# Patient Record
Sex: FEMALE | Race: WHITE | Employment: FULL TIME | URBAN - METROPOLITAN AREA
[De-identification: names, ages, dates, MRNs, and addresses within clinical notes are randomized per-mention and may not be internally consistent; named-entity substitution may affect disease eponyms.]

---

## 2017-09-28 ENCOUNTER — OFFICE VISIT (OUTPATIENT)
Dept: FAMILY MEDICINE CLINIC | Facility: CLINIC | Age: 22
End: 2017-09-28

## 2017-09-28 VITALS
SYSTOLIC BLOOD PRESSURE: 112 MMHG | HEART RATE: 106 BPM | OXYGEN SATURATION: 98 % | WEIGHT: 228 LBS | TEMPERATURE: 98 F | HEIGHT: 71 IN | BODY MASS INDEX: 31.92 KG/M2 | RESPIRATION RATE: 14 BRPM | DIASTOLIC BLOOD PRESSURE: 70 MMHG

## 2017-09-28 DIAGNOSIS — L03.116 CELLULITIS OF LEFT LOWER LEG: Primary | ICD-10-CM

## 2017-09-28 PROCEDURE — 99202 OFFICE O/P NEW SF 15 MIN: CPT | Performed by: NURSE PRACTITIONER

## 2017-09-28 RX ORDER — CEPHALEXIN 500 MG/1
500 CAPSULE ORAL 3 TIMES DAILY
Qty: 21 CAPSULE | Refills: 0 | Status: SHIPPED | OUTPATIENT
Start: 2017-09-28 | End: 2017-10-05

## 2017-09-29 NOTE — PROGRESS NOTES
CHIEF COMPLAINT:   Patient presents with:  Derm Problem      HPI:     Steven Flores is a 23year old female who presents with concerns of infected cut. Patient first noticed symptoms 1 week ago with worsening over 1-2 days.   Reports erythema, increased war LUNGS: clear to auscultation bilaterally, no wheezes or rhonchi. Breathing is non labored. CARDIO: RRR without murmur  EXTREMITIES: no cyanosis, clubbing or edema. Cap refill brisk- less than 2 seconds. LYMPH: No popletiel lymphadenopathy.       ASSESSM Cellulitis is an infection of the deep layers of skin. A break in the skin, such as a cut or scratch, can let bacteria under the skin. If the bacteria get to deep layers of the skin, it can be serious.  If not treated, cellulitis can get into the bloodstrea Date Last Reviewed: 9/1/2016  © 0700-3079 The [address] [place] [address], [address] [place], [place] [address]. All rights reserved. This information is not intended as a substitute for professional medical care.  Always follow your healthcare professional'

## 2017-09-29 NOTE — PATIENT INSTRUCTIONS
· Finish all oral antibiotics to prevent resistance  · Apply warm compress to infection site 3 times daily for 15 minutes as a time.  Be sure to use a clean wash rag each time  · Do not try to drain or \"pop\" lesion  · If skin becomes more red, swollen, p leg, keep your leg raised while sitting. This will help to reduce swelling. · Take all of the antibiotic medicine exactly as directed until it is gone. Do not miss any doses, especially during the first 7 days.  Don’t stop taking the medicine when your sym

## 2018-01-19 ENCOUNTER — OFFICE VISIT (OUTPATIENT)
Dept: FAMILY MEDICINE CLINIC | Facility: CLINIC | Age: 23
End: 2018-01-19

## 2018-01-19 VITALS
SYSTOLIC BLOOD PRESSURE: 127 MMHG | HEART RATE: 75 BPM | DIASTOLIC BLOOD PRESSURE: 80 MMHG | BODY MASS INDEX: 33.07 KG/M2 | WEIGHT: 231 LBS | HEIGHT: 70 IN

## 2018-01-19 DIAGNOSIS — Z00.00 WELL ADULT EXAM: Primary | ICD-10-CM

## 2018-01-19 DIAGNOSIS — L83 ACANTHOSIS NIGRICANS: ICD-10-CM

## 2018-01-19 DIAGNOSIS — Z23 NEEDS FLU SHOT: ICD-10-CM

## 2018-01-19 DIAGNOSIS — E66.9 CLASS 1 OBESITY WITHOUT SERIOUS COMORBIDITY WITH BODY MASS INDEX (BMI) OF 33.0 TO 33.9 IN ADULT, UNSPECIFIED OBESITY TYPE: ICD-10-CM

## 2018-01-19 DIAGNOSIS — N92.6 IRREGULAR MENSTRUATION: ICD-10-CM

## 2018-01-19 DIAGNOSIS — Z83.3 FAMILY HISTORY OF DIABETES MELLITUS: ICD-10-CM

## 2018-01-19 PROBLEM — E66.811 CLASS 1 OBESITY WITHOUT SERIOUS COMORBIDITY WITH BODY MASS INDEX (BMI) OF 33.0 TO 33.9 IN ADULT: Status: ACTIVE | Noted: 2018-01-19

## 2018-01-19 LAB
CONTROL LINE PRESENT WITH A CLEAR BACKGROUND (YES/NO): YES YES/NO
KIT LOT #: NORMAL NUMERIC

## 2018-01-19 PROCEDURE — 90686 IIV4 VACC NO PRSV 0.5 ML IM: CPT | Performed by: NURSE PRACTITIONER

## 2018-01-19 PROCEDURE — 81025 URINE PREGNANCY TEST: CPT | Performed by: NURSE PRACTITIONER

## 2018-01-19 PROCEDURE — 99395 PREV VISIT EST AGE 18-39: CPT | Performed by: NURSE PRACTITIONER

## 2018-01-19 PROCEDURE — 90471 IMMUNIZATION ADMIN: CPT | Performed by: NURSE PRACTITIONER

## 2018-01-19 RX ORDER — NORETHINDRONE ACETATE AND ETHINYL ESTRADIOL 1; .02 MG/1; MG/1
1 TABLET ORAL DAILY
Qty: 3 PACKAGE | Refills: 4 | Status: SHIPPED | OUTPATIENT
Start: 2018-01-19 | End: 2019-02-01

## 2018-01-19 RX ORDER — NORETHINDRONE ACETATE AND ETHINYL ESTRADIOL 1; .02 MG/1; MG/1
1 TABLET ORAL DAILY
Qty: 1 PACKAGE | Refills: 11 | Status: SHIPPED | OUTPATIENT
Start: 2018-01-19 | End: 2018-01-19 | Stop reason: CLARIF

## 2018-01-19 NOTE — PROGRESS NOTES
HPI    Pt here for annual physical feeling well. Is concerned about irregular periods. Had period at beginning of January-stopped for one day and then restarted.   Will get period twice in a short period of time and then stops for a few months and then she Not on file    Drug use: Unknown     Other Topics Concern   None on file     Social History Narrative   None on file         Current Outpatient Prescriptions:  Norethindrone Acet-Ethinyl Est 1-20 MG-MCG Oral Tab Take 1 tablet by mouth daily.  Disp: 3 Packag hcg-neg  Assessment:     1. Well adult exam    2. Irregular menstruation    3. Acanthosis nigricans    4. Family history of diabetes mellitus    5. Needs flu shot    6.  Class 1 obesity without serious comorbidity with body mass index (BMI) of 33.0 to 33.9

## 2018-01-19 NOTE — PATIENT INSTRUCTIONS
Everything you eat and drink over time matters. The right mix can help you be healthier now and in the future. Start with small changes to make healthier choices you can enjoy. Find your healthy eating style and maintain it for a lifetime.   This me Flu viruses are thought to spread mainly from person to person through droplets made when people with flu cough, sneeze, or talk. Flu viruses also may spread when people touch something with flu virus on it and then touch their mouth, eyes, or nose.  Many o What additional steps can I take at work to help stop the spread of germs that can cause respiratory illness, like flu? Find out about your employer’s plans if an outbreak of flu or another illness occurs and whether flu vaccinations are offered on-site. -use back up birth control for 2 weeks whenever you take antibiotics as some antibiotics may interfere with effectiveness of birth control pills  -use back up birth control for first pack of birth control pills  -pt denies personal or family history of blo

## 2018-01-20 LAB
ABSOLUTE BASOPHILS: 43 CELLS/UL (ref 0–200)
ABSOLUTE EOSINOPHILS: 68 CELLS/UL (ref 15–500)
ABSOLUTE LYMPHOCYTES: 2372 CELLS/UL (ref 850–3900)
ABSOLUTE MONOCYTES: 340 CELLS/UL (ref 200–950)
ABSOLUTE NEUTROPHILS: 5678 CELLS/UL (ref 1500–7800)
ALBUMIN/GLOBULIN RATIO: 1.4 (CALC) (ref 1–2.5)
ALBUMIN: 4.5 G/DL (ref 3.6–5.1)
ALKALINE PHOSPHATASE: 69 U/L (ref 33–115)
ALT: 14 U/L (ref 6–29)
AST: 14 U/L (ref 10–30)
BASOPHILS: 0.5 %
BILIRUBIN, TOTAL: 0.4 MG/DL (ref 0.2–1.2)
BUN: 11 MG/DL (ref 7–25)
CALCIUM: 9.7 MG/DL (ref 8.6–10.2)
CARBON DIOXIDE: 26 MMOL/L (ref 20–31)
CHLORIDE: 106 MMOL/L (ref 98–110)
CHOL/HDLC RATIO: 2.4 (CALC)
CHOLESTEROL, TOTAL: 133 MG/DL
CREATININE: 0.59 MG/DL (ref 0.5–1.1)
EGFR IF AFRICN AM: 153 ML/MIN/1.73M2
EGFR IF NONAFRICN AM: 132 ML/MIN/1.73M2
EOSINOPHILS: 0.8 %
GLOBULIN: 3.3 G/DL (CALC) (ref 1.9–3.7)
GLUCOSE: 82 MG/DL (ref 65–99)
HDL CHOLESTEROL: 56 MG/DL
HEMATOCRIT: 36.6 % (ref 35–45)
HEMOGLOBIN A1C: 5.1 % OF TOTAL HGB
HEMOGLOBIN: 12.2 G/DL (ref 11.7–15.5)
LDL-CHOLESTEROL: 63 MG/DL (CALC)
LYMPHOCYTES: 27.9 %
MCH: 26.3 PG (ref 27–33)
MCHC: 33.3 G/DL (ref 32–36)
MCV: 78.9 FL (ref 80–100)
MONOCYTES: 4 %
MPV: 9.9 FL (ref 7.5–12.5)
NEUTROPHILS: 66.8 %
NON-HDL CHOLESTEROL: 77 MG/DL (CALC)
PLATELET COUNT: 299 THOUSAND/UL (ref 140–400)
POTASSIUM: 4.1 MMOL/L (ref 3.5–5.3)
PROTEIN, TOTAL: 7.8 G/DL (ref 6.1–8.1)
RDW: 13.9 % (ref 11–15)
RED BLOOD CELL COUNT: 4.64 MILLION/UL (ref 3.8–5.1)
SODIUM: 140 MMOL/L (ref 135–146)
TRIGLYCERIDES: 59 MG/DL
TSH W/REFLEX TO FT4: 1.24 MIU/L
VITAMIN B12: 496 PG/ML (ref 200–1100)
VITAMIN D, 25-OH, TOTAL: 18 NG/ML (ref 30–100)
WHITE BLOOD CELL COUNT: 8.5 THOUSAND/UL (ref 3.8–10.8)

## 2018-09-13 ENCOUNTER — OFFICE VISIT (OUTPATIENT)
Dept: FAMILY MEDICINE CLINIC | Facility: CLINIC | Age: 23
End: 2018-09-13
Payer: COMMERCIAL

## 2018-09-13 VITALS
BODY MASS INDEX: 30.92 KG/M2 | HEART RATE: 108 BPM | WEIGHT: 216 LBS | HEIGHT: 70 IN | TEMPERATURE: 99 F | SYSTOLIC BLOOD PRESSURE: 116 MMHG | DIASTOLIC BLOOD PRESSURE: 75 MMHG

## 2018-09-13 DIAGNOSIS — J02.9 SORE THROAT: Primary | ICD-10-CM

## 2018-09-13 LAB
CONTROL LINE PRESENT WITH A CLEAR BACKGROUND (YES/NO): YES YES/NO
KIT LOT #: NORMAL NUMERIC

## 2018-09-13 PROCEDURE — 99213 OFFICE O/P EST LOW 20 MIN: CPT | Performed by: NURSE PRACTITIONER

## 2018-09-13 PROCEDURE — 87880 STREP A ASSAY W/OPTIC: CPT | Performed by: NURSE PRACTITIONER

## 2018-09-13 RX ORDER — AMOXICILLIN AND CLAVULANATE POTASSIUM 875; 125 MG/1; MG/1
1 TABLET, FILM COATED ORAL 2 TIMES DAILY
Qty: 14 TABLET | Refills: 0 | Status: SHIPPED | OUTPATIENT
Start: 2018-09-13 | End: 2018-09-20

## 2018-09-13 NOTE — PATIENT INSTRUCTIONS
When You Have a Sore Throat    A sore throat can be painful. There are many reasons why you may have a sore throat. Your healthcare provider will work with you to find the cause of your sore throat. He or she will also find the best treatment for you.   Thamas Necessary During the exam, your healthcare provider checks your ears, nose, and throat for problems.  He or she also checks for swelling in the neck, and may listen to your chest.  Possible tests  Other tests your healthcare provider may perform include:  · A throat If your sore throat is due to a bacterial infection, antibiotics may speed healing and prevent complications.  Although group A streptococcus (\"strep throat\" or GAS) is the major treatable infection for a sore throat, GAS causes only 5% to 15% of sore thr © 5904-2000 The [address]. [address] [facility], [address] [place]. All rights reserved. This information is not intended as a substitute for professional medical care. Always follow your healthcare professional's instructions.       STREP THR -Call or return to office if symptoms are not markedly improved within 3-4 days or if symptoms are worsening    If there is no significant improvement by three days after starting the antibiotic, or there is any significant worsening before then, or you ha

## 2018-09-13 NOTE — PROGRESS NOTES
HPI  Pt presents for sore throat and ear pain since yesterday. Having difficulty swallowing, hurts to talk. Has not taken any otc meds. Review of Systems   Constitutional: Positive for activity change, appetite change and fatigue.    HENT: Positive fo Not Asked        Weight Concern: Not Asked    Social History Narrative      Not on file        Current Outpatient Medications:  Amoxicillin-Pot Clavulanate 875-125 MG Oral Tab Take 1 tablet by mouth 2 (two) times daily for 7 days.  Disp: 14 tablet Rfl: 0 discussed   advised to use back up birth control for next 2 weeks while on anbx    Please call if symptoms worsen or are not resolving.            Relevant Medications    Amoxicillin-Pot Clavulanate 875-125 MG Oral Tab    Other Relevant Orders    STREP A AS

## 2018-09-13 NOTE — ASSESSMENT & PLAN NOTE
Start augmentin 875 mg I po bid x 7 days  Supportive care discussed   advised to use back up birth control for next 2 weeks while on anbx    Please call if symptoms worsen or are not resolving.

## 2019-01-26 RX ORDER — NORETHINDRONE ACETATE/ETHINYL ESTRADIOL AND FERROUS FUMARATE 1MG-20(21)
KIT ORAL
Qty: 28 TABLET | Refills: 3 | OUTPATIENT
Start: 2019-01-26

## 2019-02-01 ENCOUNTER — OFFICE VISIT (OUTPATIENT)
Dept: FAMILY MEDICINE CLINIC | Facility: CLINIC | Age: 24
End: 2019-02-01
Payer: COMMERCIAL

## 2019-02-01 VITALS
HEART RATE: 88 BPM | WEIGHT: 213 LBS | DIASTOLIC BLOOD PRESSURE: 82 MMHG | SYSTOLIC BLOOD PRESSURE: 123 MMHG | TEMPERATURE: 98 F | HEIGHT: 70 IN | BODY MASS INDEX: 30.49 KG/M2

## 2019-02-01 DIAGNOSIS — J06.9 VIRAL UPPER RESPIRATORY TRACT INFECTION: Primary | ICD-10-CM

## 2019-02-01 PROCEDURE — 99213 OFFICE O/P EST LOW 20 MIN: CPT | Performed by: NURSE PRACTITIONER

## 2019-02-01 RX ORDER — NORETHINDRONE ACETATE/ETHINYL ESTRADIOL AND FERROUS FUMARATE 1MG-20(21)
KIT ORAL
Qty: 28 TABLET | Refills: 3 | OUTPATIENT
Start: 2019-02-01

## 2019-02-01 RX ORDER — NORETHINDRONE ACETATE AND ETHINYL ESTRADIOL 1; .02 MG/1; MG/1
1 TABLET ORAL DAILY
Qty: 1 PACKAGE | Refills: 0 | Status: SHIPPED | OUTPATIENT
Start: 2019-02-01 | End: 2019-02-12

## 2019-02-01 NOTE — PROGRESS NOTES
HPI    Pt here for congestion and itchy throat for the past 2 days. Denies fever, ear pain, headache, or coughing. Has not tried any otc meds.    Review of Systems   Constitutional: Negative for activity change, appetite change, fatigue, fever and unexpect History      Marital status: Single      Spouse name: Not on file      Number of children: Not on file      Years of education: Not on file      Highest education level: Not on file    Social Needs      Financial resource strain: Not on file      Food inse rate, regular rhythm and normal heart sounds. No murmur heard. Edema not present. Pulmonary/Chest: Effort normal and breath sounds normal. No respiratory distress. She has no wheezes. She has no rales. She exhibits no tenderness. Abdominal: Soft.  B

## 2019-02-12 ENCOUNTER — OFFICE VISIT (OUTPATIENT)
Dept: FAMILY MEDICINE CLINIC | Facility: CLINIC | Age: 24
End: 2019-02-12
Payer: COMMERCIAL

## 2019-02-12 VITALS
WEIGHT: 213 LBS | HEART RATE: 92 BPM | HEIGHT: 70 IN | DIASTOLIC BLOOD PRESSURE: 75 MMHG | BODY MASS INDEX: 30.49 KG/M2 | SYSTOLIC BLOOD PRESSURE: 114 MMHG

## 2019-02-12 DIAGNOSIS — N92.6 IRREGULAR MENSTRUATION: Primary | ICD-10-CM

## 2019-02-12 DIAGNOSIS — Z00.00 WELL ADULT EXAM: ICD-10-CM

## 2019-02-12 PROCEDURE — 99395 PREV VISIT EST AGE 18-39: CPT | Performed by: NURSE PRACTITIONER

## 2019-02-12 RX ORDER — NORETHINDRONE ACETATE AND ETHINYL ESTRADIOL 1; .02 MG/1; MG/1
1 TABLET ORAL DAILY
Qty: 3 PACKAGE | Refills: 4 | Status: SHIPPED | OUTPATIENT
Start: 2019-02-12 | End: 2019-11-05

## 2019-02-12 NOTE — PROGRESS NOTES
HPI  Pt here for general physical.  Kisha Woodson well without complaints. On ocps-periods are regular; has never had sexual intercourse. Ocps help regulate her period. Uncertain if got all of her gardisil vaccines.      fmh-diabetes-aunts    Diet is good; d Date   • Adenoidectomy         History reviewed. No pertinent family history.     Social History    Socioeconomic History      Marital status: Single      Spouse name: Not on file      Number of children: Not on file      Years of education: Not on file oriented to person, place, and time. She appears well-developed and well-nourished. No distress. HENT:   Head: Normocephalic and atraumatic.    Right Ear: Tympanic membrane and ear canal normal. No cerumen present  Left Ear: Tympanic membrane and ear Forrestine Senior COMP METABOLIC PANEL (14)    CBC, PLATELET; NO DIFFERENTIAL    VITAMIN D, 25-HYDROXY    TSH W REFLEX TO FREE T4                      Discussed plan of care with pt and pt is in agreement. All questions answered. Pt to call with questions or concerns.     Enc

## 2019-02-17 PROBLEM — J06.9 VIRAL UPPER RESPIRATORY TRACT INFECTION: Status: RESOLVED | Noted: 2019-02-01 | Resolved: 2019-02-17

## 2019-02-24 ENCOUNTER — HOSPITAL ENCOUNTER (EMERGENCY)
Facility: HOSPITAL | Age: 24
Discharge: HOME | End: 2019-02-24
Attending: EMERGENCY MEDICINE
Payer: OTHER MISCELLANEOUS

## 2019-02-24 ENCOUNTER — APPOINTMENT (OUTPATIENT)
Dept: GENERAL RADIOLOGY | Facility: HOSPITAL | Age: 24
End: 2019-02-24
Payer: OTHER MISCELLANEOUS

## 2019-02-24 VITALS
TEMPERATURE: 99 F | BODY MASS INDEX: 29.82 KG/M2 | DIASTOLIC BLOOD PRESSURE: 66 MMHG | SYSTOLIC BLOOD PRESSURE: 128 MMHG | OXYGEN SATURATION: 96 % | HEART RATE: 71 BPM | WEIGHT: 213 LBS | RESPIRATION RATE: 18 BRPM | HEIGHT: 71 IN

## 2019-02-24 DIAGNOSIS — S93.402A SPRAIN OF LEFT ANKLE, UNSPECIFIED LIGAMENT, INITIAL ENCOUNTER: Primary | ICD-10-CM

## 2019-02-24 PROCEDURE — 73610 X-RAY EXAM OF ANKLE: CPT | Performed by: EMERGENCY MEDICINE

## 2019-02-24 PROCEDURE — 99283 EMERGENCY DEPT VISIT LOW MDM: CPT

## 2019-02-25 NOTE — ED NOTES
Pt presents to ED after slipping on the ice at 1130 today and hurting her left ankle. Pt has strong pedal pulse, cms intact. Pt able to wiggle toes.

## 2019-02-25 NOTE — ED PROVIDER NOTES
Patient Seen in: [facility] AND [facility] Emergency Department    History   Patient presents with: Ankle Injury    Stated Complaint: L foot inj. HPI    The patient is a 26-year-old female who slipped on the ice earlier today twisting her left ankle.   Pain o display       Aircast and crutches given      MDM      Pulse Ox: 96%, Normal, room air    Radiology findings: Xr Ankle (min 3 Views), Left (cpt=73610)    Result Date: 2/24/2019  CONCLUSION:   No acute fracture.   Dictated by (CST): Javed Yeager MD on 2

## 2019-11-05 ENCOUNTER — OFFICE VISIT (OUTPATIENT)
Dept: FAMILY MEDICINE CLINIC | Facility: CLINIC | Age: 24
End: 2019-11-05
Payer: COMMERCIAL

## 2019-11-05 VITALS
WEIGHT: 219 LBS | HEART RATE: 73 BPM | BODY MASS INDEX: 30.66 KG/M2 | HEIGHT: 71 IN | DIASTOLIC BLOOD PRESSURE: 75 MMHG | SYSTOLIC BLOOD PRESSURE: 115 MMHG

## 2019-11-05 DIAGNOSIS — Z30.017 ENCOUNTER FOR INITIAL PRESCRIPTION OF IMPLANTABLE SUBDERMAL CONTRACEPTIVE: ICD-10-CM
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
## 2019-11-05 DIAGNOSIS — Z30.017 INSERTION OF IMPLANTABLE SUBDERMAL CONTRACEPTIVE: ICD-10-CM

## 2019-11-05 DIAGNOSIS — S40.861A INSECT BITE OF RIGHT UPPER EXTREMITY, INITIAL ENCOUNTER: Primary | ICD-10-CM

## 2019-11-05 DIAGNOSIS — S40.862A INSECT BITE OF LEFT UPPER EXTREMITY, INITIAL ENCOUNTER: ICD-10-CM
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
## 2019-11-05 PROCEDURE — 11981 INSERTION DRUG DLVR IMPLANT: CPT | Performed by: NURSE PRACTITIONER

## 2019-11-05 PROCEDURE — 99213 OFFICE O/P EST LOW 20 MIN: CPT | Performed by: NURSE PRACTITIONER

## 2019-11-05 RX ORDER — PREDNISONE 20 MG/1
TABLET ORAL
Qty: 10 TABLET | Refills: 0 | Status: SHIPPED | OUTPATIENT
Start: 2019-11-05 | End: 2020-06-09 | Stop reason: ALTCHOICE

## 2019-11-05 NOTE — PATIENT INSTRUCTIONS
Insect, Spider, and Scorpion Bites and Stings     The black  (top) and brown recluse (bottom) are two poisonous spiders found in the [place]. Most insect bites are harmless and cause only minor swelling or itching.  But if you’re allergic t Easing symptoms of an insect bite or sting  · Try to remove a stinger you can see. Use your fingernail, a knife edge, or credit card to scrape against the skin. Do not squeeze or pull.   · Apply ice or a cold compress to reduce pain and swelling (keep a [employer] · If you had a severe reaction, the provider may prescribe an epinephrine kit. Epinephrine will stop an allergic reaction from getting worse. Before you leave the hospital, be sure that you understand when and how to use this medicine.   · Diphenhydramine i · Future reactions could be worse than this one, so try to stay away from places where you might be stung again. · Be aware that honeybees nest in trees. Wasps and yellow jackets nest in the ground, trees, or roof eaves.   · If you are stung by a honeybee, · Very drowsy or trouble awakening  · Fainting or loss of consciousness  · Rapid heart rate  · Low blood pressure  · Feeling of doom  · Nausea, vomiting, abdominal pain, or diarrhea  · Vomiting blood, or large amounts of blood in stool  · Seizure  When to · signs of infection at the insertion site such as fever, and skin redness, pain or discharge  · signs of pregnancy  · signs and symptoms of a blood clot such as breathing problems; changes in vision; chest pain; severe, sudden headache; pain, swelling, wa Where should I keep my medicine? This drug is given in a hospital or clinic and will not be stored at home. What should I tell my health care provider before I take this medicine?   They need to know if you have any of these conditions:  · abnormal vagina

## 2019-11-05 NOTE — PROCEDURES
Nexplanon Insertion Procedure Note  PRE-OP DIAGNOSIS: desired long-term, reversible contraception   POST-OP DIAGNOSIS: Same   PROCEDURE: Nexplanon ® placement  Performing Physician: _ FANY San, CHEN-C    Supervising Physician (if applicable):

## 2019-11-05 NOTE — PROGRESS NOTES
HPI    Pt here for contraception-did not like side effects of ocps and would forget to take them. Would like to have nexplanon inserted  Has 3 large itchy bites on arms-woke up this am with them. Has a few smaller bites on hands.  Very red, swollen and itch Active member of club or organization: Not on file        Attends meetings of clubs or organizations: Not on file        Relationship status: Not on file      Intimate partner violence:        Fear of current or ex partner: Not on file        Emotion predniSONE 20 MG Oral Tab       Other    Encounter for initial prescription of implantable subdermal contraceptive     nexplanon insertion           Other Visit Diagnoses     Insertion of implantable subdermal contraceptive        Relevant Medications

## 2019-11-05 NOTE — ASSESSMENT & PLAN NOTE
Prednisone 40 mg I po q d x 5 days  Supportive care discussed to help alleviate symptoms  Please call if symptoms worsen or are not resolving.

## 2020-06-09 ENCOUNTER — OFFICE VISIT (OUTPATIENT)
Dept: FAMILY MEDICINE CLINIC | Facility: CLINIC | Age: 25
End: 2020-06-09
Payer: COMMERCIAL

## 2020-06-09 VITALS
WEIGHT: 237 LBS | HEART RATE: 73 BPM | HEIGHT: 71 IN | SYSTOLIC BLOOD PRESSURE: 113 MMHG | TEMPERATURE: 100 F | BODY MASS INDEX: 33.18 KG/M2 | DIASTOLIC BLOOD PRESSURE: 76 MMHG

## 2020-06-09 DIAGNOSIS — Z30.46 ENCOUNTER FOR SURVEILLANCE OF IMPLANTABLE SUBDERMAL CONTRACEPTIVE: ICD-10-CM

## 2020-06-09 DIAGNOSIS — E66.9 CLASS 1 OBESITY WITHOUT SERIOUS COMORBIDITY WITH BODY MASS INDEX (BMI) OF 33.0 TO 33.9 IN ADULT, UNSPECIFIED OBESITY TYPE: ICD-10-CM

## 2020-06-09 DIAGNOSIS — N63.13 BREAST LUMP ON RIGHT SIDE AT 7 O'CLOCK POSITION: Primary | ICD-10-CM

## 2020-06-09 PROBLEM — J02.9 SORE THROAT: Status: RESOLVED | Noted: 2018-09-13 | Resolved: 2020-06-09

## 2020-06-09 PROCEDURE — 99214 OFFICE O/P EST MOD 30 MIN: CPT | Performed by: NURSE PRACTITIONER

## 2020-06-09 NOTE — PROGRESS NOTES
HPI  Pt presents to discuss birth control. Has been bleeding since mid April-spotting but yesterday was heavier so thinks it is period. Had Nexplanon placed in November.   Has noticed her acne worsened and has gained weight-would like to remove Nexplano Alcohol use: Not on file      Drug use: Not on file      Sexual activity: Not on file    Lifestyle      Physical activity:        Days per week: Not on file        Minutes per session: Not on file      Stress: Not on file    Relationships      Social c Problem List Items Addressed This Visit        Other    Class 1 obesity without serious comorbidity with body mass index (BMI) of 33.0 to 33.9 in adult     Please aim to eat a diet high in fresh fruits and vegetables, lean protein sources, complex carbohyd

## 2020-06-09 NOTE — PATIENT INSTRUCTIONS
Birth Control Methods  Birth control methods are used to help prevent pregnancy. There are many different methods to choose from.  Talk to your healthcare provider about which method is right for you. Be sure to ask your provider about the effectiveness o A condom is a sheath that forms a thin barrier between the penis and the vagina. It helps prevent pregnancy by keeping sperm from entering the vagina.  When latex condoms are used, they have the added benefit of protecting against most STIs (sexually transm This is when the man pulls his penis out of the vagina just before ejaculation (“coming”). This lowers the amount of sperm entering the vagina.  Be aware that fluids released just before ejaculation often still contain some sperm, so this method is not as r · Another exam by your healthcare provider or a gynecologist  · Imaging tests, such as a mammogram or ultrasound  · Biopsy (procedure to remove small tissue samples from the breast lump)  Your healthcare provider will explain any additional testing that is Find your healthy eating style and maintain it for a lifetime. This means: * Make half your plate fruits and vegetables. *Focus on whole fruits. * Vary your veggies.         *Eat foods that are very colorful; limit white foods-white sugar, w
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
## 2020-06-11 PROBLEM — S40.862A INSECT BITE OF LEFT ARM: Status: RESOLVED | Noted: 2019-11-05 | Resolved: 2020-06-11

## 2020-06-11 PROBLEM — S40.861A INSECT BITE OF RIGHT ARM: Status: RESOLVED | Noted: 2019-11-05 | Resolved: 2020-06-11
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
## 2020-06-16 ENCOUNTER — TELEPHONE (OUTPATIENT)
Dept: FAMILY MEDICINE CLINIC | Facility: CLINIC | Age: 25
End: 2020-06-16

## 2020-06-16 ENCOUNTER — HOSPITAL ENCOUNTER (OUTPATIENT)
Dept: ULTRASOUND IMAGING | Facility: HOSPITAL | Age: 25
Discharge: HOME | End: 2020-06-16
Attending: NURSE PRACTITIONER
Payer: COMMERCIAL

## 2020-06-16 DIAGNOSIS — N63.13 BREAST LUMP ON RIGHT SIDE AT 7 O'CLOCK POSITION: ICD-10-CM

## 2020-06-16 DIAGNOSIS — N63.13 BREAST LUMP ON RIGHT SIDE AT 7 O'CLOCK POSITION: Primary | ICD-10-CM

## 2020-06-16 PROCEDURE — 76642 ULTRASOUND BREAST LIMITED: CPT | Performed by: NURSE PRACTITIONER

## 2020-06-16 NOTE — IMAGING NOTE
This nurse introduced self and role of breast coordinator. Discussed recommended breast biopsy with patient. Miss Kofi Graham was recommended by Dr. Maryanne Blanco to have a Right Breast ultrasound guided biopsy.    Pt history discussed as below:      CONCLUSION: Radiology medication protocol  discussed. Miss Biju Olivier verbalized understanding and                                   agreement.     -Aspirin (325mg) or 81 mg  aspirin  being taken related to a cardiac condition should be held at the direction of your physic to tylenol . Discussed with patient no swimming, bathing,  hot tubs or submerging underwater  for 5 days post procedure until the incision is closed and healed.    Educated patient on lifting restrictions – nothing heavier than a gallon of milk for 24-48 h

## 2020-06-16 NOTE — TELEPHONE ENCOUNTER
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 Order placed for biopsy.

## 2020-06-19 ENCOUNTER — HOSPITAL ENCOUNTER (OUTPATIENT)
Dept: ULTRASOUND IMAGING | Facility: HOSPITAL | Age: 25
Discharge: HOME | End: 2020-06-19
Attending: NURSE PRACTITIONER
Payer: COMMERCIAL

## 2020-06-19 ENCOUNTER — HOSPITAL ENCOUNTER (OUTPATIENT)
Dept: MAMMOGRAPHY | Facility: HOSPITAL | Age: 25
End: 2020-06-19
Attending: NURSE PRACTITIONER
Payer: COMMERCIAL

## 2020-06-19 VITALS — DIASTOLIC BLOOD PRESSURE: 52 MMHG | HEART RATE: 75 BPM | SYSTOLIC BLOOD PRESSURE: 119 MMHG

## 2020-06-19 DIAGNOSIS — N63.13 BREAST LUMP ON RIGHT SIDE AT 7 O'CLOCK POSITION: ICD-10-CM

## 2020-06-19 PROCEDURE — 88305 TISSUE EXAM BY PATHOLOGIST: CPT | Performed by: NURSE PRACTITIONER

## 2020-06-19 PROCEDURE — 19083 BX BREAST 1ST LESION US IMAG: CPT | Performed by: NURSE PRACTITIONER

## 2020-06-19 NOTE — PROCEDURES
[facility]  Procedure Note    Steven Flores Patient Status:  Outpatient    9/15/1995 MRN [id number]   Location [address] [facility] Attending SHANNAN Torres   Hosp Day # 0 PCP None Pcp     Procedure: ultrasound

## 2020-06-22 ENCOUNTER — TELEPHONE (OUTPATIENT)
Dept: HEMATOLOGY/ONCOLOGY | Facility: HOSPITAL | Age: 25
End: 2020-06-22

## 2020-06-22 DIAGNOSIS — D24.1 FIBROADENOMA OF RIGHT BREAST: Primary | ICD-10-CM

## 2020-06-22 NOTE — IMAGING NOTE
1133:  Contacted SHANNAN Armstrong to report pathology results and Dr. Bharati Hays recommendations.   Ghulam Armstrong instructed OK to contact Miss Elvis Marte to provide pathology results and Dr. Bharati Hays  recommendations and refer to Dr. Elke Garcia for surgical co

## 2020-06-22 NOTE — TELEPHONE ENCOUNTER
Left brief message on voicemail. Calling with results of biopsy, will call again in am to discuss further. Results are good. Called this am and spoke with patient. 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  Pt had no further questions and will follow w

## 2020-06-22 NOTE — IMAGING NOTE
1157:  Ms. Darrell Martinez contacted post ultrasound guided right breast biopsy. Post biopsy care and instructions reviewed. Ms. Darrell Martinez without questions or concerns at this time.     Pathology results and Dr. Higinio Barbosa recommendations provided as follows:     Lilia Robert

## 2021-02-18 ENCOUNTER — OFFICE VISIT (OUTPATIENT)
Dept: FAMILY MEDICINE CLINIC | Facility: CLINIC | Age: 26
End: 2021-02-18
Payer: COMMERCIAL

## 2021-02-18 VITALS
HEART RATE: 91 BPM | HEIGHT: 71 IN | BODY MASS INDEX: 33.18 KG/M2 | WEIGHT: 237 LBS | SYSTOLIC BLOOD PRESSURE: 133 MMHG | DIASTOLIC BLOOD PRESSURE: 81 MMHG

## 2021-02-18 DIAGNOSIS — Z30.09 ENCOUNTER FOR OTHER GENERAL COUNSELING OR ADVICE ON CONTRACEPTION: Primary | ICD-10-CM

## 2021-02-18 DIAGNOSIS — L73.1 INGROWING HAIR: ICD-10-CM

## 2021-02-18 PROBLEM — Z30.9 CONTRACEPTIVE MANAGEMENT: Status: ACTIVE | Noted: 2021-02-18

## 2021-02-18 PROCEDURE — 3079F DIAST BP 80-89 MM HG: CPT | Performed by: NURSE PRACTITIONER

## 2021-02-18 PROCEDURE — 99214 OFFICE O/P EST MOD 30 MIN: CPT | Performed by: NURSE PRACTITIONER

## 2021-02-18 PROCEDURE — 3008F BODY MASS INDEX DOCD: CPT | Performed by: NURSE PRACTITIONER

## 2021-02-18 PROCEDURE — 3075F SYST BP GE 130 - 139MM HG: CPT | Performed by: NURSE PRACTITIONER

## 2021-02-18 RX ORDER — LEVONORGESTREL AND ETHINYL ESTRADIOL 0.15-0.03
1 KIT ORAL DAILY
Qty: 91 TABLET | Refills: 3 | Status: SHIPPED | OUTPATIENT
Start: 2021-02-18

## 2021-02-18 NOTE — ASSESSMENT & PLAN NOTE
Warm compresses  Triple antibiotics ointment w hydrocortisone cream tid  Please call if symptoms worsen or are not resolving.

## 2021-02-18 NOTE — PROGRESS NOTES
HPI  Pt here to discuss birth control options. Current has Nexplanon-is getting bad acne and weight gain. Likes not having periods every month. Has bump on perineal area-tender to touch.      Review of Systems   Constitutional: Positive for unexpected w Active member of club or organization: Not on file        Attends meetings of clubs or organizations: Not on file        Relationship status: Not on file      Intimate partner violence        Fear of current or ex partner: Not on file        Emotiona

## 2021-03-23 ENCOUNTER — OFFICE VISIT (OUTPATIENT)
Dept: FAMILY MEDICINE CLINIC | Facility: CLINIC | Age: 26
End: 2021-03-23
Payer: COMMERCIAL

## 2021-03-23 VITALS
WEIGHT: 238 LBS | HEIGHT: 71 IN | SYSTOLIC BLOOD PRESSURE: 124 MMHG | DIASTOLIC BLOOD PRESSURE: 83 MMHG | BODY MASS INDEX: 33.32 KG/M2 | HEART RATE: 84 BPM

## 2021-03-23 DIAGNOSIS — Z30.41 ENCOUNTER FOR SURVEILLANCE OF CONTRACEPTIVE PILLS: Primary | ICD-10-CM

## 2021-03-23 DIAGNOSIS — Z30.46 NEXPLANON REMOVAL: ICD-10-CM

## 2021-03-23 PROCEDURE — 3074F SYST BP LT 130 MM HG: CPT | Performed by: NURSE PRACTITIONER

## 2021-03-23 PROCEDURE — 3079F DIAST BP 80-89 MM HG: CPT | Performed by: NURSE PRACTITIONER

## 2021-03-23 PROCEDURE — 99212 OFFICE O/P EST SF 10 MIN: CPT | Performed by: NURSE PRACTITIONER

## 2021-03-23 PROCEDURE — 3008F BODY MASS INDEX DOCD: CPT | Performed by: NURSE PRACTITIONER

## 2021-03-23 NOTE — PROCEDURES
Nexplanon Removal    Procedure disucssed with pt-consent reviewed and signed. 1 % lidocaine was injected underneath the tip of the Nexplanon hitesh that is closest to the right elbow. Nexplanon was located by palpation.   2 mm incision was made at the tip

## 2021-03-23 NOTE — PROGRESS NOTES
HPI    Pt here for Nexplanon removal. Has been taking ocps for the past 3 weeks and feels well without side effects.   Review of Systems     03/23/21  1109   BP: 124/83   Pulse: 84   Weight: 238 lb (108 kg)   Height: 5' 11\" (1.803 m)     Body mass index is Feeling of Stress :   Social Connections:       Frequency of Communication with Friends and Family:       Frequency of Social Gatherings with Friends and Family:       Attends Jain Services:       Active Member of Clubs or Organizations:       Attends

## 2021-03-25 PROBLEM — L73.1 INGROWING HAIR: Status: RESOLVED | Noted: 2021-02-18 | Resolved: 2021-03-25

## 2021-06-18 NOTE — ASSESSMENT & PLAN NOTE
Discussed will get u/s of breast to determine what mass is prior to removing nexplanon and then starting ocps  Discussed bc options and pros and cons
Please aim to eat a diet high in fresh fruits and vegetables, lean protein sources, complex carbohydrates and limited processed and fast foods.   Try to get at least 150 minutes of exercise per week-a combination of weight resistance and cardio is preferred
U/s right breast lump
negative

## 2021-09-26 ENCOUNTER — PATIENT MESSAGE (OUTPATIENT)
Dept: FAMILY MEDICINE CLINIC | Facility: CLINIC | Age: 26
End: 2021-09-26

## 2021-09-27 ENCOUNTER — TELEPHONE (OUTPATIENT)
Dept: FAMILY MEDICINE CLINIC | Facility: CLINIC | Age: 26
End: 2021-09-27

## 2021-09-27 NOTE — TELEPHONE ENCOUNTER
RN pls call pt and triage or offer ov if needed, thanks. From: Steven Flores  To: SHANNAN Calabrese  Sent: 9/26/2021  5:16 PM CDT  Subject: Bruised toe? Hello! I just had a quick question.  On Thursday, I had stubbed my toe really bad a

## 2022-02-11 ENCOUNTER — PATIENT MESSAGE (OUTPATIENT)
Dept: FAMILY MEDICINE CLINIC | Facility: CLINIC | Age: 27
End: 2022-02-11

## 2022-02-11 NOTE — TELEPHONE ENCOUNTER
Agnes Osman RN 2/11/2022 8:26 AM CST        ----- Message -----  From: Grace Janie  Sent: 2/11/2022 6:54 AM CST  To: [facility] R[facility] Triage  Subject: Birth control     Hello! I was just wondering what would I have to do to get more refills on my birth control?  Would I have to make an appointment first?

## 2022-02-15 ENCOUNTER — OFFICE VISIT (OUTPATIENT)
Dept: FAMILY MEDICINE CLINIC | Facility: CLINIC | Age: 27
End: 2022-02-15
Payer: COMMERCIAL

## 2022-02-15 VITALS
SYSTOLIC BLOOD PRESSURE: 137 MMHG | HEIGHT: 71 IN | DIASTOLIC BLOOD PRESSURE: 84 MMHG | BODY MASS INDEX: 31.92 KG/M2 | HEART RATE: 77 BPM | WEIGHT: 228 LBS

## 2022-02-15 DIAGNOSIS — H18.891 CORNEAL IRRITATION OF RIGHT EYE: ICD-10-CM

## 2022-02-15 DIAGNOSIS — Z30.09 ENCOUNTER FOR OTHER GENERAL COUNSELING OR ADVICE ON CONTRACEPTION: ICD-10-CM

## 2022-02-15 DIAGNOSIS — Z30.41 ENCOUNTER FOR SURVEILLANCE OF CONTRACEPTIVE PILLS: Primary | ICD-10-CM

## 2022-02-15 PROCEDURE — 3008F BODY MASS INDEX DOCD: CPT | Performed by: NURSE PRACTITIONER

## 2022-02-15 PROCEDURE — 99213 OFFICE O/P EST LOW 20 MIN: CPT | Performed by: NURSE PRACTITIONER

## 2022-02-15 PROCEDURE — 3079F DIAST BP 80-89 MM HG: CPT | Performed by: NURSE PRACTITIONER

## 2022-02-15 PROCEDURE — 3075F SYST BP GE 130 - 139MM HG: CPT | Performed by: NURSE PRACTITIONER

## 2022-02-15 RX ORDER — LEVONORGESTREL AND ETHINYL ESTRADIOL 0.15-0.03
1 KIT ORAL DAILY
Qty: 91 TABLET | Refills: 3 | Status: SHIPPED | OUTPATIENT
Start: 2022-02-15

## 2022-02-15 RX ORDER — SULFACETAMIDE SODIUM 100 MG/ML
1 SOLUTION/ DROPS OPHTHALMIC EVERY 4 HOURS
Qty: 1 EACH | Refills: 0 | Status: SHIPPED | OUTPATIENT
Start: 2022-02-15

## 2022-02-15 RX ORDER — LEVONORGESTREL AND ETHINYL ESTRADIOL 0.15-0.03
KIT ORAL
Qty: 91 TABLET | Refills: 0 | OUTPATIENT
Start: 2022-02-15

## 2022-12-16 NOTE — TELEPHONE ENCOUNTER
From: Allan Zambrano  To: SHANNAN Lopez  Sent: 9/26/2021 5:16 PM CDT  Subject: Bruised toe? Hello! I just had a quick question. On Thursday, I had stubbed my toe really bad and my toe on my right foot got swollen and it is bruised now.  I can ki [de-identified] : Progress Note completed by Jesusita Duckworth PA-C\par * Dr. Alvarado -- The documentation recorded in this note accurately reflects the decisions made by me during this visit.

## 2023-02-08 ENCOUNTER — OFFICE VISIT (OUTPATIENT)
Dept: FAMILY MEDICINE CLINIC | Facility: CLINIC | Age: 28
End: 2023-02-08

## 2023-02-08 VITALS
HEART RATE: 68 BPM | DIASTOLIC BLOOD PRESSURE: 85 MMHG | WEIGHT: 240 LBS | HEIGHT: 71 IN | BODY MASS INDEX: 33.6 KG/M2 | SYSTOLIC BLOOD PRESSURE: 139 MMHG

## 2023-02-08 DIAGNOSIS — N63.13 BREAST LUMP ON RIGHT SIDE AT 7 O'CLOCK POSITION: Primary | ICD-10-CM

## 2023-02-08 DIAGNOSIS — F12.90 MARIJUANA USE: ICD-10-CM

## 2023-02-08 DIAGNOSIS — R55 SYNCOPE, UNSPECIFIED SYNCOPE TYPE: ICD-10-CM

## 2023-02-08 DIAGNOSIS — Z30.41 ENCOUNTER FOR SURVEILLANCE OF CONTRACEPTIVE PILLS: ICD-10-CM

## 2023-02-08 PROCEDURE — 3075F SYST BP GE 130 - 139MM HG: CPT | Performed by: NURSE PRACTITIONER

## 2023-02-08 PROCEDURE — 99214 OFFICE O/P EST MOD 30 MIN: CPT | Performed by: NURSE PRACTITIONER

## 2023-02-08 PROCEDURE — 3008F BODY MASS INDEX DOCD: CPT | Performed by: NURSE PRACTITIONER

## 2023-02-08 PROCEDURE — 3079F DIAST BP 80-89 MM HG: CPT | Performed by: NURSE PRACTITIONER

## 2023-02-08 RX ORDER — LEVONORGESTREL AND ETHINYL ESTRADIOL 0.15-0.03
1 KIT ORAL DAILY
Qty: 91 TABLET | Refills: 3 | Status: SHIPPED | OUTPATIENT
Start: 2023-02-08

## 2023-02-08 NOTE — ASSESSMENT & PLAN NOTE
Discussed w pt that syncope can be a side effect/s of marijuana citlali if it has a high thc content-can cause vasodilation and then syncope  Advised to avoid vaping due to high % of thc in vape cartridges  Discussed w pt asking friend what kind of marijuana she had and to get THC %  Discussed long term effects of THC on general health

## 2023-02-08 NOTE — ASSESSMENT & PLAN NOTE
Discussed w pt importance of screening q 6 months as recommended or for surgical removal of mass  Order placed for ultrasound

## 2023-04-04 ENCOUNTER — HOSPITAL ENCOUNTER (OUTPATIENT)
Dept: ULTRASOUND IMAGING | Facility: HOSPITAL | Age: 28
Discharge: HOME | End: 2023-04-04
Attending: NURSE PRACTITIONER
Payer: COMMERCIAL

## 2023-04-04 DIAGNOSIS — N63.13 BREAST LUMP ON RIGHT SIDE AT 7 O'CLOCK POSITION: ICD-10-CM

## 2023-04-04 DIAGNOSIS — D24.1 BREAST FIBROADENOMA, RIGHT: Primary | ICD-10-CM

## 2023-04-04 PROCEDURE — 76642 ULTRASOUND BREAST LIMITED: CPT | Performed by: NURSE PRACTITIONER

## 2023-06-21 ENCOUNTER — OFFICE VISIT (OUTPATIENT)
Dept: SURGERY | Facility: CLINIC | Age: 28
End: 2023-06-21
Payer: COMMERCIAL

## 2023-06-21 VITALS
BODY MASS INDEX: 33.6 KG/M2 | HEIGHT: 71 IN | RESPIRATION RATE: 16 BRPM | TEMPERATURE: 99 F | WEIGHT: 240 LBS | SYSTOLIC BLOOD PRESSURE: 139 MMHG | DIASTOLIC BLOOD PRESSURE: 86 MMHG | HEART RATE: 92 BPM | OXYGEN SATURATION: 97 %

## 2023-06-21 DIAGNOSIS — N63.10 MASS OF RIGHT BREAST, UNSPECIFIED QUADRANT: Primary | ICD-10-CM

## 2023-06-21 PROCEDURE — 3008F BODY MASS INDEX DOCD: CPT | Performed by: SURGERY

## 2023-06-21 PROCEDURE — 3079F DIAST BP 80-89 MM HG: CPT | Performed by: SURGERY

## 2023-06-21 PROCEDURE — 3075F SYST BP GE 130 - 139MM HG: CPT | Performed by: SURGERY

## 2023-06-21 PROCEDURE — 99244 OFF/OP CNSLTJ NEW/EST MOD 40: CPT | Performed by: SURGERY

## 2023-06-21 NOTE — PATIENT INSTRUCTIONS
Dr. Keeley Brown  Tel: [phone]  Fax: [phone]  [address] [place], [place] [address]  [phone]     Surgery/Procedure: Excision of right breast mass     Anesthesia:   MAC  Surgery Length:   45 minutes CPT:  90126   Wire LOC:   No   Nuc Med:   No   Minnie Seed:  No       Dx & ICD-10: Mass of right breast, unspecified quadrant (N63.10)   Radiology Instructions: N/A   _______________________________________________________________________________    Someone must accompany you the day of the procedure to drive you home safely, because of anesthesia. You will need an adult  to stay with you the first night following your surgery. You must remove any kind of makeup, acrylic nails, lotions, powders, creams or deodorant. [place] ONLY: Pre-admission will give instruct you on when to take Gatorade and Tylenol/acetaminophen prior to your surgery, purchase 2 - 12oz bottles of regular Gatorade (NOT RED/SUGAR FREE). Otherwise, you may not eat or drink anything else after 11PM the night before surgery. [place] ONLY: You may not eat or drink anything after midnight the day of your surgery. Wear comfortable clothing that can be easily removed. If you wear dentures, contacts lenses, or any prosthesis, you will be asked to remove them. Do not drink alcohol or smoke 24 hours prior to your procedure. Bring a picture ID and your insurance card. GENERAL ANESTHESIA ONLY: You will be contacted by the hospital for Pre-Admission Covid-19 testing (regardless of vaccination status) to be scheduled as an appointment prior to surgery. They will call closer to the surgery date to set this up, because the earliest this can be done is 72 hours prior to surgery. The Pre-Admission Testing Department will call the day before to confirm your procedure, give you the time you need to arrive by and directions on where to go.  They begin making calls after 2pm, if you are not contacted by 4pm, please call the surgeon's office listed above. Do not take any blood thinners at least one week prior to the procedure/surgery. This includes aspirin, baby aspirin, Ibuprofen products, herbal supplements, diet medications, vitamin E, fish oil and green tea supplements. Please check other supplements for these ingredients. *TYLENOL or acetaminophen is acceptable*  If you take Coumadin, Plavix, Xarelto, or Eliquis, please contact your prescribing physician for special instructions on how long to hold. If you take insulin contact your primary care physician for special instructions. Our surgery scheduler, Srinivasa Wall, will be contacting you to discuss surgery dates. If you have any questions related to scheduling your surgery, please reach out to her at [phone].  _____________________________________________________________________  PRE-OPERATIVE TESTING IF INDICATED BELOW  PLEASE COMPLETE ASAP (AT LEAST 14-21 DAYS PRIOR TO SURGERY)  [] CBC [] BMP [] CMP [] EKG    [] PT, PTT, INR [] Cardiac Clearance  [] H&P Medical Clearance [] Chest X-ray     Please call Central Scheduling to schedule an appointment for pre-operative labs/tests @ [phone]    Does the patient have a pacemaker or ICD?      [] Yes   [x] No

## 2023-06-27 ENCOUNTER — TELEPHONE (OUTPATIENT)
Dept: SURGERY | Facility: HOSPITAL | Age: 28
End: 2023-06-27

## 2023-06-27 DIAGNOSIS — N63.10 MASS OF RIGHT BREAST, UNSPECIFIED QUADRANT: Primary | ICD-10-CM

## 2023-07-14 ENCOUNTER — TELEPHONE (OUTPATIENT)
Dept: SURGERY | Facility: CLINIC | Age: 28
End: 2023-07-14

## 2023-07-14 NOTE — TELEPHONE ENCOUNTER
LVM for patient to check if she is interested in moving up her surgery to 7/17 if so to call us back. Name and phone number provided.

## 2023-08-07 ENCOUNTER — ANESTHESIA EVENT (OUTPATIENT)
Dept: SURGERY | Facility: HOSPITAL | Age: 28
End: 2023-08-07
Payer: COMMERCIAL

## 2023-08-07 ENCOUNTER — ANESTHESIA (OUTPATIENT)
Dept: SURGERY | Facility: HOSPITAL | Age: 28
End: 2023-08-07
Payer: COMMERCIAL

## 2023-08-07 ENCOUNTER — HOSPITAL ENCOUNTER (OUTPATIENT)
Facility: HOSPITAL | Age: 28
Setting detail: HOSPITAL OUTPATIENT SURGERY
Discharge: HOME | End: 2023-08-07
Attending: SURGERY | Admitting: SURGERY
Payer: COMMERCIAL

## 2023-08-07 VITALS
SYSTOLIC BLOOD PRESSURE: 92 MMHG | WEIGHT: 240 LBS | TEMPERATURE: 98 F | DIASTOLIC BLOOD PRESSURE: 66 MMHG | BODY MASS INDEX: 33.6 KG/M2 | HEART RATE: 61 BPM | RESPIRATION RATE: 18 BRPM | OXYGEN SATURATION: 98 % | HEIGHT: 71 IN

## 2023-08-07 DIAGNOSIS — N63.10 MASS OF RIGHT BREAST, UNSPECIFIED QUADRANT: ICD-10-CM

## 2023-08-07 LAB — B-HCG UR QL: NEGATIVE

## 2023-08-07 PROCEDURE — 81025 URINE PREGNANCY TEST: CPT

## 2023-08-07 PROCEDURE — 0HBT0ZZ EXCISION OF RIGHT BREAST, OPEN APPROACH: ICD-10-PCS | Performed by: SURGERY

## 2023-08-07 PROCEDURE — 88307 TISSUE EXAM BY PATHOLOGIST: CPT | Performed by: SURGERY

## 2023-08-07 RX ORDER — ONDANSETRON 2 MG/ML
4 INJECTION INTRAMUSCULAR; INTRAVENOUS EVERY 6 HOURS PRN
Status: DISCONTINUED | OUTPATIENT
Start: 2023-08-07 | End: 2023-08-07

## 2023-08-07 RX ORDER — PROCHLORPERAZINE EDISYLATE 5 MG/ML
5 INJECTION INTRAMUSCULAR; INTRAVENOUS EVERY 8 HOURS PRN
Status: DISCONTINUED | OUTPATIENT
Start: 2023-08-07 | End: 2023-08-07

## 2023-08-07 RX ORDER — SODIUM CHLORIDE, SODIUM LACTATE, POTASSIUM CHLORIDE, CALCIUM CHLORIDE 600; 310; 30; 20 MG/100ML; MG/100ML; MG/100ML; MG/100ML
INJECTION, SOLUTION INTRAVENOUS CONTINUOUS
Status: DISCONTINUED | OUTPATIENT
Start: 2023-08-07 | End: 2023-08-07

## 2023-08-07 RX ORDER — MORPHINE SULFATE 4 MG/ML
4 INJECTION, SOLUTION INTRAMUSCULAR; INTRAVENOUS EVERY 10 MIN PRN
Status: DISCONTINUED | OUTPATIENT
Start: 2023-08-07 | End: 2023-08-07

## 2023-08-07 RX ORDER — KETOROLAC TROMETHAMINE 15 MG/ML
15 INJECTION, SOLUTION INTRAMUSCULAR; INTRAVENOUS ONCE AS NEEDED
Status: DISCONTINUED | OUTPATIENT
Start: 2023-08-07 | End: 2023-08-07

## 2023-08-07 RX ORDER — FAMOTIDINE 20 MG/1
20 TABLET, FILM COATED ORAL ONCE
Status: COMPLETED | OUTPATIENT
Start: 2023-08-07 | End: 2023-08-07

## 2023-08-07 RX ORDER — BUPIVACAINE HYDROCHLORIDE 5 MG/ML
INJECTION, SOLUTION EPIDURAL; INTRACAUDAL AS NEEDED
Status: DISCONTINUED | OUTPATIENT
Start: 2023-08-07 | End: 2023-08-07 | Stop reason: HOSPADM

## 2023-08-07 RX ORDER — KETOROLAC TROMETHAMINE 30 MG/ML
30 INJECTION, SOLUTION INTRAMUSCULAR; INTRAVENOUS ONCE AS NEEDED
Status: DISCONTINUED | OUTPATIENT
Start: 2023-08-07 | End: 2023-08-07

## 2023-08-07 RX ORDER — METOCLOPRAMIDE 10 MG/1
10 TABLET ORAL ONCE
Status: COMPLETED | OUTPATIENT
Start: 2023-08-07 | End: 2023-08-07

## 2023-08-07 RX ORDER — HYDROCODONE BITARTRATE AND ACETAMINOPHEN 5; 325 MG/1; MG/1
1-2 TABLET ORAL EVERY 6 HOURS PRN
Qty: 20 TABLET | Refills: 0 | Status: SHIPPED | OUTPATIENT
Start: 2023-08-07

## 2023-08-07 RX ORDER — MIDAZOLAM HYDROCHLORIDE 1 MG/ML
INJECTION INTRAMUSCULAR; INTRAVENOUS AS NEEDED
Status: DISCONTINUED | OUTPATIENT
Start: 2023-08-07 | End: 2023-08-07 | Stop reason: SURG

## 2023-08-07 RX ORDER — HYDROMORPHONE HYDROCHLORIDE 1 MG/ML
0.2 INJECTION, SOLUTION INTRAMUSCULAR; INTRAVENOUS; SUBCUTANEOUS EVERY 5 MIN PRN
Status: DISCONTINUED | OUTPATIENT
Start: 2023-08-07 | End: 2023-08-07

## 2023-08-07 RX ORDER — MORPHINE SULFATE 4 MG/ML
2 INJECTION, SOLUTION INTRAMUSCULAR; INTRAVENOUS EVERY 10 MIN PRN
Status: DISCONTINUED | OUTPATIENT
Start: 2023-08-07 | End: 2023-08-07

## 2023-08-07 RX ORDER — HYDROMORPHONE HYDROCHLORIDE 1 MG/ML
0.6 INJECTION, SOLUTION INTRAMUSCULAR; INTRAVENOUS; SUBCUTANEOUS EVERY 5 MIN PRN
Status: DISCONTINUED | OUTPATIENT
Start: 2023-08-07 | End: 2023-08-07

## 2023-08-07 RX ORDER — CEFAZOLIN SODIUM/WATER 2 G/20 ML
2 SYRINGE (ML) INTRAVENOUS ONCE
Status: COMPLETED | OUTPATIENT
Start: 2023-08-07 | End: 2023-08-07

## 2023-08-07 RX ORDER — HYDROMORPHONE HYDROCHLORIDE 1 MG/ML
0.4 INJECTION, SOLUTION INTRAMUSCULAR; INTRAVENOUS; SUBCUTANEOUS EVERY 5 MIN PRN
Status: DISCONTINUED | OUTPATIENT
Start: 2023-08-07 | End: 2023-08-07

## 2023-08-07 RX ORDER — MORPHINE SULFATE 10 MG/ML
6 INJECTION, SOLUTION INTRAMUSCULAR; INTRAVENOUS EVERY 10 MIN PRN
Status: DISCONTINUED | OUTPATIENT
Start: 2023-08-07 | End: 2023-08-07

## 2023-08-07 RX ORDER — NALOXONE HYDROCHLORIDE 0.4 MG/ML
80 INJECTION, SOLUTION INTRAMUSCULAR; INTRAVENOUS; SUBCUTANEOUS AS NEEDED
Status: DISCONTINUED | OUTPATIENT
Start: 2023-08-07 | End: 2023-08-07

## 2023-08-07 RX ORDER — ACETAMINOPHEN 500 MG
1000 TABLET ORAL ONCE
Status: COMPLETED | OUTPATIENT
Start: 2023-08-07 | End: 2023-08-07

## 2023-08-07 RX ORDER — LIDOCAINE HYDROCHLORIDE AND EPINEPHRINE 10; 10 MG/ML; UG/ML
INJECTION, SOLUTION INFILTRATION; PERINEURAL AS NEEDED
Status: DISCONTINUED | OUTPATIENT
Start: 2023-08-07 | End: 2023-08-07 | Stop reason: HOSPADM

## 2023-08-07 RX ADMIN — CEFAZOLIN SODIUM/WATER 2 G: 2 G/20 ML SYRINGE (ML) INTRAVENOUS at 07:39:00

## 2023-08-07 RX ADMIN — MIDAZOLAM HYDROCHLORIDE 2 MG: 1 INJECTION INTRAMUSCULAR; INTRAVENOUS at 07:30:00

## 2023-08-07 NOTE — BRIEF OP NOTE
Pre-Operative Diagnosis: Mass of right breast, unspecified quadrant [N63.10]     Post-Operative Diagnosis: Mass of right breast, unspecified quadrant [N63.10]      Procedure Performed:   Excision of right breast mass    Surgeon(s) and Role:     Olegario Rosas MD - Primary    Assistant(s):  Surgical Assistant.: Anika Glaser     Surgical Findings: Mass at 7:00     Specimen: R breast mass     Estimated Blood Loss: Blood Output: 5 mL (8/7/2023  7:54 AM)    Radha Brown MD  8/7/2023  7:55 AM

## 2023-08-07 NOTE — OPERATIVE REPORT
[facility]    PATIENT'S NAME: Axel Poe   ATTENDING PHYSICIAN: Lonny Everett. Solitario Alexander MD   OPERATING PHYSICIAN: Lonny Everett. Solitario Alexander MD   PATIENT ACCOUNT#:   [id number]    LOCATION:  [address]  MEDICAL RECORD #:   [id number]       YOB: 1995  ADMISSION DATE:       08/07/2023      OPERATION DATE:  08/07/2023    OPERATIVE REPORT    PREOPERATIVE DIAGNOSIS:  Right breast fibroadenoma. POSTOPERATIVE DIAGNOSIS:  Right breast fibroadenoma. PROCEDURE:  Excision of right breast fibroadenoma. ASSISTANT:  Johanna Cruz CSA    ANESTHESIA:  Monitored anesthesia care and local.    ESTIMATED BLOOD LOSS:  5 mL. DRAINS:  None. COMPLICATIONS:  None. DISPOSITION:  Stable on transfer to recovery room. INDICATIONS:  The patient is a 79-year-old female with a biopsy confirmed fibroadenoma of the right breast that has now increased in size and symptomatology, greater than 3 cm in size, and therefore, surgical excision has been recommended. The risks and possible complications were discussed with the patient including, but not limited to, infection, bleeding, injury to surrounding structures, possible need for reoperation. She agreed to the proposed surgery. OPERATIVE TECHNIQUE:  The patient was brought to the OR, placed in supine position, properly padded and secured, given a dose of IV antibiotics and sequential compression devices were applied to legs for DVT prophylaxis. Monitored anesthesia care was induced. The right breast was then prepped and draped in usual sterile fashion. Lidocaine 1% with epinephrine was used to infiltrate the skin and subcutaneous tissue at the targeted incision site. A curvilinear incision was made along the lateral inferior areolar border with a 15 blade knife in the skin. The mass was identified at 7 o'clock, brought into the field and excised, sent for routine permanent pathologic evaluation. Wound was irrigated.   Hemostasis assured with electrocautery and closure was accomplished with interrupted 3-0 Vicryl for deep layer, running 4-0 subcuticular Monocryl for skin. Mastisol and Steri-Strips were applied, and 0.5% Marcaine was instilled in the cavity to assist with postoperative analgesia. A sterile dressing and compression bra were placed. Her blood loss was minimal.  All counts were correct at the conclusion of procedure. She tolerated the procedure well. She was transferred to the recovery area in stable condition. Dictated By Tristan Chan.  Tarun Reynaga MD  d: 08/07/2023 08:11:19  t: 08/07/2023 08:41:12  Job [id number]  CMG/    cc: FANY Fonseca

## 2023-08-16 ENCOUNTER — OFFICE VISIT (OUTPATIENT)
Dept: SURGERY | Facility: CLINIC | Age: 28
End: 2023-08-16
Payer: COMMERCIAL

## 2023-08-16 VITALS
TEMPERATURE: 98 F | HEART RATE: 82 BPM | SYSTOLIC BLOOD PRESSURE: 128 MMHG | RESPIRATION RATE: 17 BRPM | DIASTOLIC BLOOD PRESSURE: 86 MMHG | OXYGEN SATURATION: 95 %

## 2023-08-16 DIAGNOSIS — D24.1 FIBROADENOMA, RIGHT: Primary | ICD-10-CM

## 2023-08-16 PROCEDURE — 3079F DIAST BP 80-89 MM HG: CPT | Performed by: SURGERY

## 2023-08-16 PROCEDURE — 3074F SYST BP LT 130 MM HG: CPT | Performed by: SURGERY

## 2023-08-16 PROCEDURE — 99024 POSTOP FOLLOW-UP VISIT: CPT | Performed by: SURGERY

## 2023-08-17 PROBLEM — D24.1 FIBROADENOMA, RIGHT: Status: ACTIVE | Noted: 2023-08-17

## 2024-02-15 DIAGNOSIS — Z30.41 ENCOUNTER FOR SURVEILLANCE OF CONTRACEPTIVE PILLS: ICD-10-CM

## 2024-02-15 RX ORDER — LEVONORGESTREL AND ETHINYL ESTRADIOL 0.15-0.03
1 KIT ORAL DAILY
Qty: 91 TABLET | Refills: 0 | Status: SHIPPED | OUTPATIENT
Start: 2024-02-15

## 2024-02-15 NOTE — TELEPHONE ENCOUNTER
No Pap  Please review.Protocol failed/ No protocol      Requested Prescriptions   Pending Prescriptions Disp Refills    SETLAKIN 0.15-0.03 MG Oral Tab [Pharmacy Med Name: Setlakin 0.15-0.03 Mg Tab Nort] 91 tablet 0     Sig: Take 1 tablet by mouth daily.       Gynecology Medication Protocol Failed - 2/15/2024  1:30 AM        Failed - PASS-PENDING LAST PAP WNL--VIA MANUAL LOOKUP        Failed - Physical or Pelvic/Breast in past 12 or next 3 mos--VIA MANUAL LOOKUP             Future Appointments         Provider Department Appt Notes    In 6 days Vianney Vázquez MD [facility] 6 month F/U             Recent Outpatient Visits              6 months ago Fibroadenoma, right    [facility] Vianney Vázquez MD    Office Visit    7 months ago Mass of right breast, unspecified quadrant    [facility] Vianney Vázquez MD    Office Visit    1 year ago Breast lump on right side at 7 o'clock position    [facility], Yeny Ortega APRN    Office Visit    2 years ago Encounter for surveillance of contraceptive pills    [facility], Yeny Ortega APRN    Office Visit    2 years ago Encounter for surveillance of contraceptive pills    [facility], Yeny Ortega APRN    Office Visit

## 2024-02-21 ENCOUNTER — OFFICE VISIT (OUTPATIENT)
Dept: SURGERY | Facility: CLINIC | Age: 29
End: 2024-02-21
Payer: COMMERCIAL

## 2024-02-21 VITALS
HEART RATE: 86 BPM | SYSTOLIC BLOOD PRESSURE: 130 MMHG | OXYGEN SATURATION: 96 % | BODY MASS INDEX: 34.07 KG/M2 | TEMPERATURE: 99 F | WEIGHT: 243.38 LBS | RESPIRATION RATE: 16 BRPM | DIASTOLIC BLOOD PRESSURE: 77 MMHG | HEIGHT: 71 IN

## 2024-02-21 DIAGNOSIS — D24.1 FIBROADENOMA, RIGHT: Primary | ICD-10-CM

## 2024-02-21 PROCEDURE — 3075F SYST BP GE 130 - 139MM HG: CPT | Performed by: SURGERY

## 2024-02-21 PROCEDURE — 99213 OFFICE O/P EST LOW 20 MIN: CPT | Performed by: SURGERY

## 2024-02-21 PROCEDURE — 3008F BODY MASS INDEX DOCD: CPT | Performed by: SURGERY

## 2024-02-21 PROCEDURE — 3078F DIAST BP <80 MM HG: CPT | Performed by: SURGERY

## 2024-02-21 NOTE — PROGRESS NOTES
Breast Surgery Surveillance Visit    Diagnosis: Fibroadenoma, right breast, s/p excision of right breast mass on 2023    Stage: N/A    Disease Status:  Surgical treatment complete, no further treatment pending.    History of Present Illness:   Ms. Griselda Mena is a 28 year old woman who presents with a self detected mass in the right breast.  The patient first noticed this in 2020.  At that time she underwent a biopsy that confirmed a fibroadenoma.  Since that time she Reports that this is increased in size and symptomatology. she surveillance right breast ultrasound on 2023 confirmed interval enlargement of the solid mass up to 3.3 cm at 7:00, 6 cm from the nipple.  Reports that this is increased in size and symptomatology.  She denies any other palpable masses, nipple discharge, skin changes or axillary symptoms.  She has no other prior history of breast disease or biopsies.  She does not have any known family history of breast cancer. She underwent excision of right breast mass, which occurred without complication.  She denies any new concerns clinically to her breast since her excision.  She is here today for evaluation and recommendations for further therapy.        No past medical history on file.    Past Surgical History:   Procedure Laterality Date    ADENOIDECTOMY         Gynecological History:  Pt is a   She achieved menarche at age 14 and LMP 2023  She denies any history of hormone replacement therapy.  She has history of oral contraceptive use for 11 years, currently using.  She denies infertility treatment to achieve pregnancy.    Medications:    No outpatient medications have been marked as taking for the 24 encounter (Appointment) with Vianney Vázquez MD.       Allergies:    No Known Allergies    Family History:   No family history on file.    She is not of Ashkenazi Yazidism ancestry.    Social History:  History   Alcohol Use Not Currently         History   Smoking  Status    Never   Smokeless Tobacco    Never       Ms. Griselda Mena is Single with 0 children. She has 2 siblings. She is currently Employed full-time      Review of Systems:  General:   The patient denies, fever, chills, night sweats, fatigue, generalized weakness, change in appetite or weight loss.    HEENT:     The patient denies eye irritation, cataracts, redness, glaucoma, yellowing of the eyes, change in vision or color blindness. The patient denies hearing loss, ringing in the ears, ear drainage, earaches, nasal congestion, nose bleeds, snoring, pain in mouth/throat, hoarseness, change in voice, facial trauma. +glasses/contacts    Respiratory:  The patient denies chronic cough, phlegm, hemoptysis, pleurisy/chest pain, pneumonia, asthma, wheezing, difficulty in breathing with exertion, emphysema, chronic bronchitis, shortness of breath or abnormal sound when breathing.     Cardiovascular:  There is no history of chest pain, chest pressure/discomfort, palpitations, irregular heartbeat, fainting or near-fainting, difficulty breathing when lying flat, SOB/Coughing at night, swelling of the legs or chest pain while walking.    Breasts:  See history of present illness    Gastrointestinal:     There is no history of difficulty or pain with swallowing, reflux symptoms, vomiting, dark or bloody stools, constipation, yellowing of the skin, indigestion, nausea, change in bowel habits, diarrhea, abdominal pain or vomiting blood.     Genitourinary:  The patient denies frequent urination, needing to get up at night to urinate, urinary hesitancy or retaining urine, painful urination, urinary incontinence, decreased urine stream, blood in the urine or vaginal/penile discharge.    Skin:    The patient denies rash, itching, skin lesions, dry skin, change in skin color or change in moles.     Hematologic/Lymphatic:  The patient denies easily bruising or bleeding or persistent swollen glands or lymph nodes.      Musculoskeletal:  The patient denies muscle aches/pain, joint pain, stiff joints, neck pain, back pain or bone pain.    Neuropsychiatric:  There is no history of migraines or severe headaches, seizure/epilepsy, speech problems, coordination problems, trembling/tremors, fainting/black outs, dizziness, memory problems, loss of sensation/numbness, problems walking, weakness, tingling or burning in hands/feet. There is no history of abusive relationship, bipolar disorder, sleep disturbance, anxiety, depression or feeling of despair.    Endocrine:    There is no history of poor/slow wound healing, weight loss/gain, fertility or hormone problems, cold intolerance, thyroid disease.     Allergic/Immunologic:  There is no history of hives, hay fever, angioedema or anaphylaxis.    LMP 07/18/2023 (Within Days)     Physical Exam:  The patient is an alert, oriented, well-nourished and  well-developed woman who appears her stated age. Her speech patterns and movements are normal. Her affect is appropriate.    HEENT: The head is normocephalic. The neck is supple. The thyroid is not enlarged and is without palpable masses/nodules. There are no palpable masses. The trachea is in the midline. Conjunctiva are clear, non-icteric.    Chest: The chest expands symmetrically. The lungs are clear to auscultation.    Heart: The rhythm is regular.  There are no murmurs, rubs, gallops or thrills.    Breasts:  Her breasts are symmetrical with a cup size 40C.  Right breast: The skin, nipple ,and areola appear normal. There is no skin dimpling with movement of the pectoralis. There is no nipple retraction. No nipple discharge can be elicited. The parenchyma is mildly nodular. There are no palpable masses in the breast.  There is a well-healed incision with no signs of new or recurrent concerns with no palpable masses. The axillary tail is normal.  Left breast:   The skin, nipple, and areola appear normal. There is no skin dimpling with  movement of the pectoralis. There is no nipple retraction. No nipple discharge can be elicited. The parenchyma is mildly nodular. There are no dominant masses in the breast. The axillary tail is normal.    Abdomen:  The abdomen is soft, flat and non tender. The liver is not enlarged. There are no palpable masses.    Lymph Nodes:  The supraclavicular, axillary and cervical regions are free of significant lymphadenopathy.    Back: There is no vertebral column tenderness.    Skin: The skin appears normal. There are no suspicious appearing rashes or lesions.    Extremities: The extremities are without deformity, cyanosis or edema.    Impression:   Ms. Griselda Mena is a 28 year old woman presents with an enlarging, symptomatic right breast fibroadenoma, s/p excision of right breast fibroadenoma.    Recommendations:   I had a discussion with the Patient regarding her breast exam.  She is healing well since surgery with no signs of new or recurrent concerns. I personally reviewed her pathology.  Pathology confirmed 4.6 cm fibroadenoma.  We discussed this does not require any further intervention and does not increase future breast cancer risk.   Anticipate she will not require regular breast imaging until she turns 42 unless she develops any new clinical changes.  She was given ample opportunity for questions and those questions were answered to her satisfaction. She was encouraged to contact the office with any questions or concerns as needed related to her breast health.    This encounter lasted total 25 minutes, more than 50% of which was dedicated to the discussion of management options.

## 2024-05-16 DIAGNOSIS — Z30.41 ENCOUNTER FOR SURVEILLANCE OF CONTRACEPTIVE PILLS: ICD-10-CM

## 2024-05-17 RX ORDER — LEVONORGESTREL AND ETHINYL ESTRADIOL 0.15-0.03
1 KIT ORAL DAILY
Qty: 91 TABLET | Refills: 0 | Status: SHIPPED | OUTPATIENT
Start: 2024-05-17

## 2024-05-17 NOTE — TELEPHONE ENCOUNTER
Please review;  [facility] protocol failed/ No protocol     No recent PAP noted with our lab nor in Care Everywhere      Requested Prescriptions   Pending Prescriptions Disp Refills    SETLAKIN 0.15-0.03 MG Oral Tab [Pharmacy Med Name: Setlakin 0.15-0.03 Mg Tab Nort] 91 tablet 0     Sig: TAKE ONE TABLET BY MOUTH ONE TIME DAILY       Gynecology Medication Protocol Failed - 5/16/2024  9:40 AM        Failed - PASS-PENDING LAST PAP WNL--VIA MANUAL LOOKUP        Passed - Physical or Pelvic/Breast in past 12 or next 3 mos--VIA MANUAL LOOKUP           Future Appointments         Provider Department Appt Notes    In 5 days Yeny Perez APRN [facility] Annual physical          Recent Outpatient Visits              2 months ago Fibroadenoma, right    [facility], [facility], Vianney Ross MD    Office Visit    9 months ago Fibroadenoma, right    [facility], Vianney Ross MD    Office Visit    11 months ago Mass of right breast, unspecified quadrant    [facility], Vianney Ross MD    Office Visit    1 year ago Breast lump on right side at 7 o'clock position    [facility], [place]    Office Visit    2 years ago Encounter for surveillance of contraceptive pills    [facility] [place]    Office Visit

## 2024-08-18 DIAGNOSIS — Z30.41 ENCOUNTER FOR SURVEILLANCE OF CONTRACEPTIVE PILLS: ICD-10-CM

## 2024-08-19 NOTE — TELEPHONE ENCOUNTER
Please review. Protocol Failed or has no Protocol.  No pap on file. Next appointment with Yeny CAMPBELL on 8/27/24 1:00 pm.    Please reply to pool: EM RN TRIAGE      Requested Prescriptions   Pending Prescriptions Disp Refills    SETLAKIN 0.15-0.03 MG Oral Tab [Pharmacy Med Name: Setlakin 0.15-0.03 Mg Tab Nort] 91 tablet 0     Sig: TAKE ONE TABLET BY MOUTH ONE TIME DAILY. no refills until seen in the office for physical, labs and pap.       Gynecology Medication Protocol Failed - 8/18/2024 12:53 PM        Failed - PASS-PENDING LAST PAP WNL--VIA MANUAL LOOKUP        Passed - Physical or Pelvic/Breast in past 12 or next 3 mos--VIA MANUAL LOOKUP

## 2024-08-20 RX ORDER — LEVONORGESTREL AND ETHINYL ESTRADIOL 0.15-0.03
1 KIT ORAL DAILY
Qty: 84 TABLET | Refills: 0 | Status: SHIPPED | OUTPATIENT
Start: 2024-08-20

## 2024-10-02 ENCOUNTER — OFFICE VISIT (OUTPATIENT)
Dept: FAMILY MEDICINE CLINIC | Facility: CLINIC | Age: 29
End: 2024-10-02
Payer: COMMERCIAL

## 2024-10-02 VITALS
RESPIRATION RATE: 14 BRPM | BODY MASS INDEX: 34.93 KG/M2 | DIASTOLIC BLOOD PRESSURE: 79 MMHG | HEART RATE: 75 BPM | SYSTOLIC BLOOD PRESSURE: 120 MMHG | TEMPERATURE: 98 F | HEIGHT: 69 IN | WEIGHT: 235.81 LBS

## 2024-10-02 DIAGNOSIS — Z12.4 SCREENING FOR CERVICAL CANCER: ICD-10-CM

## 2024-10-02 DIAGNOSIS — Z01.419 WELL WOMAN EXAM WITH ROUTINE GYNECOLOGICAL EXAM: Primary | ICD-10-CM

## 2024-10-02 DIAGNOSIS — Z11.3 SCREENING EXAMINATION FOR STD (SEXUALLY TRANSMITTED DISEASE): ICD-10-CM

## 2024-10-02 DIAGNOSIS — E66.811 CLASS 1 OBESITY WITHOUT SERIOUS COMORBIDITY WITH BODY MASS INDEX (BMI) OF 33.0 TO 33.9 IN ADULT, UNSPECIFIED OBESITY TYPE: ICD-10-CM

## 2024-10-02 DIAGNOSIS — Z83.3 FAMILY HISTORY OF DIABETES MELLITUS: ICD-10-CM

## 2024-10-02 DIAGNOSIS — Z30.41 ENCOUNTER FOR SURVEILLANCE OF CONTRACEPTIVE PILLS: ICD-10-CM

## 2024-10-02 PROBLEM — F12.90 MARIJUANA USE: Status: RESOLVED | Noted: 2023-02-08 | Resolved: 2024-10-02

## 2024-10-02 PROBLEM — Z30.46 NEXPLANON REMOVAL: Status: RESOLVED | Noted: 2019-11-05 | Resolved: 2024-10-02

## 2024-10-02 PROBLEM — N63.13 BREAST LUMP ON RIGHT SIDE AT 7 O'CLOCK POSITION: Status: RESOLVED | Noted: 2020-06-09 | Resolved: 2024-10-02

## 2024-10-02 PROBLEM — D24.1 FIBROADENOMA, RIGHT: Status: RESOLVED | Noted: 2023-08-17 | Resolved: 2024-10-02

## 2024-10-02 PROBLEM — N63.10 MASS OF RIGHT BREAST: Status: RESOLVED | Noted: 2023-06-21 | Resolved: 2024-10-02

## 2024-10-02 PROBLEM — R55 SYNCOPE: Status: RESOLVED | Noted: 2023-02-08 | Resolved: 2024-10-02

## 2024-10-02 PROBLEM — H18.891 CORNEAL IRRITATION OF RIGHT EYE: Status: RESOLVED | Noted: 2022-02-15 | Resolved: 2024-10-02

## 2024-10-02 PROBLEM — D24.1 BREAST FIBROADENOMA, RIGHT: Status: RESOLVED | Noted: 2023-04-04 | Resolved: 2024-10-02

## 2024-10-02 PROCEDURE — 87491 CHLMYD TRACH DNA AMP PROBE: CPT | Performed by: NURSE PRACTITIONER

## 2024-10-02 PROCEDURE — 87591 N.GONORRHOEAE DNA AMP PROB: CPT | Performed by: NURSE PRACTITIONER

## 2024-10-02 PROCEDURE — 88175 CYTOPATH C/V AUTO FLUID REDO: CPT | Performed by: NURSE PRACTITIONER

## 2024-10-02 PROCEDURE — 81514 NFCT DS BV&VAGINITIS DNA ALG: CPT | Performed by: NURSE PRACTITIONER

## 2024-10-02 NOTE — PROGRESS NOTES
HPI  Pt presents for well  adult physical .   Had removal right breast fibroadenoma 8/2023.     Is on ocps.     Diet-fair  Exercise-minimal  Sleep-well rested    Periods-91 days ocps  Has never had pap smear.   Sexually active-has multiple partners-would like std testing    No smoking, vaping or marijuana use. Stopped using marijuana,     Has h/o tonsil stones-seems to notice more with increase in allergy symptoms-is not taking any over the counter meds      Review of Systems   Constitutional:  Negative for activity change, appetite change, fatigue, fever and unexpected weight change.   HENT:  Negative for congestion, ear pain, rhinorrhea and sneezing.    Eyes:  Negative for pain, redness and visual disturbance.   Respiratory:  Negative for cough, chest tightness, shortness of breath and wheezing.    Cardiovascular:  Negative for chest pain and palpitations.   Gastrointestinal:  Negative for abdominal distention, abdominal pain, constipation, diarrhea, nausea and vomiting.   Genitourinary:  Negative for dysuria, menstrual problem, vaginal discharge and vaginal pain.   Musculoskeletal:  Negative for back pain, gait problem and myalgias.   Skin:  Negative for color change and rash.   Neurological:  Negative for dizziness, weakness and headaches.   Psychiatric/Behavioral:  Negative for dysphoric mood and sleep disturbance. The patient is not nervous/anxious.        Vitals:    10/02/24 1722   BP: 120/79   Pulse: 75   Resp: 14   Temp: 98.4 °F (36.9 °C)   TempSrc: Oral   Weight: 235 lb 12.8 oz (107 kg)   Height: 5' 9\" (1.753 m)     Body mass index is 34.82 kg/m².  Wt Readings from Last 6 Encounters:   10/02/24 235 lb 12.8 oz (107 kg)   02/21/24 243 lb 6.4 oz (110.4 kg)   08/07/23 240 lb (108.9 kg)   06/21/23 240 lb (108.9 kg)   02/08/23 240 lb (108.9 kg)   02/15/22 228 lb (103.4 kg)        Health Maintenance   Topic Date Due    Annual Physical  Never done    DTaP,Tdap,and Td Vaccines (1 - Tdap) Never done    Pap Smear   [facility]  [address]  [facility] [address]    July 10, 2024    Cameron Bueno  [address] [facility] [address]      Dear Cameron,    I have been attempting to reach you since our last contact. I would like to continue to work with you and provide any additional support you may need on achieving your health care related goals. I would appreciate if you would give me a call at [phone] to let me know if you would like to continue working together. I know that there are many things that can affect our ability to communicate and I hope we can continue to work together.    All of us at the [facility] are invested in your health and are here to assist you in meeting your goals. Thank you.         Sincerely,    Iqra Holley, W   Certified Community Health Worker  [facility](s):   [facility]:   Clinic: [phone]  Clinic Care Coordination Service  Office: [phone]  Covering Clinic(s):   [place] ([place]), [place] ([place]), & [place] ([place])  Phone: [phone]     Never done    COVID-19 Vaccine (1 - 2023-24 season) Never done    Influenza Vaccine (1) 10/01/2024    Annual Depression Screening  Completed    Pneumococcal Vaccine: Birth to 64yrs  Aged Out       Patient's last menstrual period was 07/18/2024 (approximate).    Past Medical History:    Breast fibroadenoma, right    Fibroadenoma, right    Marijuana use    Nexplanon removal    Nexplanon inserted right arm 11/5/19         .  Past Surgical History:   Procedure Laterality Date    Adenoidectomy         No family history on file.    Social History     Socioeconomic History    Marital status: Single     Spouse name: Not on file    Number of children: Not on file    Years of education: Not on file    Highest education level: Not on file   Occupational History    Not on file   Tobacco Use    Smoking status: Never    Smokeless tobacco: Never   Vaping Use    Vaping status: Never Used   Substance and Sexual Activity    Alcohol use: Not Currently    Drug use: Yes     Frequency: 5.0 times per week     Types: Cannabis    Sexual activity: Not on file   Other Topics Concern    Caffeine Concern Not Asked    Exercise Not Asked    Seat Belt Not Asked    Special Diet Not Asked    Stress Concern Not Asked    Weight Concern Not Asked   Social History Narrative    Not on file     Social Determinants of Health     Financial Resource Strain: Not on file   Food Insecurity: Not on file   Transportation Needs: Not on file   Physical Activity: Not on file   Stress: Not on file   Social Connections: Not on file   Housing Stability: At Risk (8/18/2023)    Received from [employer], [facility]     Living Situation: Not on file     Housing Problems: Not on file       Current Outpatient Medications   Medication Sig Dispense Refill    Levonorgest-Eth Estrad 91-Day (SETLAKIN) 0.15-0.03 MG Oral Tab Take 1 tablet by mouth daily. Pt overdue for physical  and labs-no add'n refills 84 tablet 0    metRONIDAZOLE 500 MG Oral Tab Take 1 tablet  (500 mg total) by mouth 2 (two) times daily for 7 days. 14 tablet 0       Allergies:  No Known Allergies    Physical Exam  Vitals and nursing note reviewed.   Constitutional:       General: She is not in acute distress.     Appearance: Normal appearance. She is well-developed. She is obese.   HENT:      Head: Normocephalic and atraumatic.      Right Ear: Tympanic membrane, ear canal and external ear normal.      Left Ear: Tympanic membrane, ear canal and external ear normal.      Nose: Nose normal. No congestion or rhinorrhea.      Mouth/Throat:      Mouth: Mucous membranes are moist.      Pharynx: Oropharynx is clear. No oropharyngeal exudate or posterior oropharyngeal erythema.   Eyes:      General:         Right eye: No discharge.         Left eye: No discharge.      Conjunctiva/sclera: Conjunctivae normal.      Pupils: Pupils are equal, round, and reactive to light.   Neck:      Thyroid: No thyromegaly.   Cardiovascular:      Rate and Rhythm: Normal rate and regular rhythm.      Heart sounds: Normal heart sounds. No murmur heard.  Pulmonary:      Effort: Pulmonary effort is normal. No respiratory distress.      Breath sounds: Normal breath sounds. No wheezing or rales.   Chest:      Chest wall: No tenderness.   Breasts:     Right: Normal.      Left: Normal.       Abdominal:      General: Bowel sounds are normal. There is no distension.      Palpations: Abdomen is soft.      Tenderness: There is no abdominal tenderness.   Genitourinary:     General: Normal vulva.      Exam position: Lithotomy position.      Pubic Area: No rash or pubic lice.       Labia:         Right: No rash, tenderness, lesion or injury.         Left: No rash, tenderness, lesion or injury.       Urethra: No prolapse, urethral pain, urethral swelling or urethral lesion.      Vagina: Normal.      Cervix: Normal. Eversion present.      Uterus: Absent.       Adnexa: Right adnexa normal and left adnexa normal.      Rectum: Normal.    Musculoskeletal:         General: No tenderness. Normal range of motion.      Cervical back: Normal range of motion and neck supple.   Lymphadenopathy:      Cervical: No cervical adenopathy.      Upper Body:      Right upper body: No supraclavicular, axillary or pectoral adenopathy.      Left upper body: No supraclavicular, axillary or pectoral adenopathy.   Skin:     General: Skin is warm and dry.      Findings: No rash.   Neurological:      Mental Status: She is alert and oriented to person, place, and time.      Coordination: Coordination normal.   Psychiatric:         Behavior: Behavior normal.         Thought Content: Thought content normal.         Judgment: Judgment normal.         Assessment and Plan:  Problem List Items Addressed This Visit       Class 1 obesity without serious comorbidity with body mass index (BMI) of 33.0 to 33.9 in adult     I recommend that you try/continue to decrease the amount of carbohydrates that you ingest (bread products, rice, pasta, potatoes, cereals, starchy vegetables and high sugar containing foods and  beverages). Also try to increase the amount of vegetables and protein that you eat daily.  Decrease the amount of processed and fast foods. Try to exercise at least 30 minutes per day, 4-5 times per week, combination of cardio and weight training.     Check cmp ,hga1c and insulin levels         Relevant Orders    Insulin    Contraceptive management     Continue ocps  Encourage safe sex practices         Family history of diabetes mellitus     I recommend that you try/continue to decrease the amount of carbohydrates that you ingest (bread products, rice, pasta, potatoes, cereals, starchy vegetables and high sugar containing foods and  beverages). Also try to increase the amount of vegetables and protein that you eat daily.  Decrease the amount of processed and fast foods. Try to exercise at least 30 minutes per day, 4-5 times per week, combination of cardio and weight  training.              Screening examination for STD (sexually transmitted disease)     Std screening  Encourage safe sex practices         Relevant Orders    HIV Ag/Ab Combo    HSV 1 & 2 Glycoprotein Specific AB,IGG    T Pallidum Screening [place]    Screening for cervical cancer     Pap w hpv reflex, vag culture           Relevant Orders    ThinPrep Pap with HPV Reflex, Chlamydia/GC    ThinPrep PAP with HPV Reflex Request    Well woman exam with routine gynecological exam - Primary     Screening labs  Please aim to eat a diet high in fresh fruits and vegetables, lean protein sources, complex carbohydrates and limited processed and fast foods.  Try to get at least 150 minutes of exercise per week-a combination of weight resistance and cardio is preferred.    Pap completed today  Std screening  Encourage monthly self breast exam  Encourage safe sex practices  Declines flu shot         Relevant Orders    CBC, Platelet; No Differential    Comp Metabolic Panel (14)    Hemoglobin A1C    Insulin    Lipid Panel    TSH W Reflex To Free T4    Vitamin B12    Vitamin D    ThinPrep Pap with HPV Reflex, Chlamydia/GC    Chlamydia/Gc Amplification (Completed)    Vaginitis Vaginosis PCR Panel (Completed)    ThinPrep PAP with HPV Reflex Request                   Discussed plan of care with pt and pt is in agreement.All questions answered. Pt to call with questions or concerns.    Encouraged to sign up for My Chart if not already registered.     Note to patient and family:  The 21st Century Cures Act makes medical notes available to patients in the interest of transparency.  However, please be advised that this is a medical document.  It is intended as a peer to peer communication.  It is written in medical language and may contain abbreviations or verbiage that are technical and unfamiliar.  It may appear blunt or direct.  Medical documents are intended to carry relevant information, facts as evident, and the clinical opinion of  the practitioner.

## 2024-10-03 LAB
BV BACTERIA DNA VAG QL NAA+PROBE: POSITIVE
C GLABRATA DNA VAG QL NAA+PROBE: NEGATIVE
C KRUSEI DNA VAG QL NAA+PROBE: NEGATIVE
C TRACH DNA SPEC QL NAA+PROBE: NEGATIVE
CANDIDA DNA VAG QL NAA+PROBE: NEGATIVE
N GONORRHOEA DNA SPEC QL NAA+PROBE: NEGATIVE
T VAGINALIS DNA VAG QL NAA+PROBE: NEGATIVE

## 2024-10-04 RX ORDER — METRONIDAZOLE 500 MG/1
500 TABLET ORAL 2 TIMES DAILY
Qty: 14 TABLET | Refills: 0 | Status: SHIPPED | OUTPATIENT
Start: 2024-10-04 | End: 2024-10-11

## 2024-10-06 NOTE — ASSESSMENT & PLAN NOTE
I recommend that you try/continue to decrease the amount of carbohydrates that you ingest (bread products, rice, pasta, potatoes, cereals, starchy vegetables and high sugar containing foods and  beverages). Also try to increase the amount of vegetables and protein that you eat daily.  Decrease the amount of processed and fast foods. Try to exercise at least 30 minutes per day, 4-5 times per week, combination of cardio and weight training.

## 2024-10-06 NOTE — ASSESSMENT & PLAN NOTE
Screening labs  Please aim to eat a diet high in fresh fruits and vegetables, lean protein sources, complex carbohydrates and limited processed and fast foods.  Try to get at least 150 minutes of exercise per week-a combination of weight resistance and cardio is preferred.    Pap completed today  Std screening  Encourage monthly self breast exam  Encourage safe sex practices  Declines flu shot

## 2024-10-06 NOTE — ASSESSMENT & PLAN NOTE
I recommend that you try/continue to decrease the amount of carbohydrates that you ingest (bread products, rice, pasta, potatoes, cereals, starchy vegetables and high sugar containing foods and  beverages). Also try to increase the amount of vegetables and protein that you eat daily.  Decrease the amount of processed and fast foods. Try to exercise at least 30 minutes per day, 4-5 times per week, combination of cardio and weight training.     Check cmp ,hga1c and insulin levels

## 2024-11-07 DIAGNOSIS — Z30.41 ENCOUNTER FOR SURVEILLANCE OF CONTRACEPTIVE PILLS: ICD-10-CM

## 2024-11-08 RX ORDER — LEVONORGESTREL AND ETHINYL ESTRADIOL 0.15-0.03
1 KIT ORAL DAILY
Qty: 84 TABLET | Refills: 3 | Status: SHIPPED | OUTPATIENT
Start: 2024-11-08

## 2024-11-08 NOTE — TELEPHONE ENCOUNTER
Last pap 10/2/24    Pap smear is negative. Please repeat in 3 years. Your chlamydia and gonorrhea testing is negative for infection.     Please continue annual physical  exams.  Please let me know if you have any questions or concerns.     FANY Naidu, FNP-C   Written by SHANNAN Interiano on 10/7/2024 12:39 PM CDT

## 2024-11-08 NOTE — TELEPHONE ENCOUNTER
Refill passed per [facility] protocol.   Requested Prescriptions   Pending Prescriptions Disp Refills    SETLAKIN 0.15-0.03 MG Oral Tab [Pharmacy Med Name: Setlakin 0.15-0.03 Mg Tab Nort] 91 tablet 0     Sig: TAKE 1 TABLET BY MOUTH DAILY. PT OVERDUE FOR PHYSICAL  AND LABS-NO ADD'N REFILLS       Gynecology Medication Protocol Passed - 11/8/2024  1:30 PM        Passed - PASS-PENDING LAST PAP WNL--VIA MANUAL LOOKUP        Passed - Physical or Pelvic/Breast in past 12 or next 3 mos--VIA MANUAL LOOKUP              Recent Outpatient Visits              1 month ago Well woman exam with routine gynecological exam    [facility] [place], [facility], Yeny Ortega APRN    Office Visit    8 months ago Fibroadenoma, right    [facility] [place], [facility]], Vianney Ross MD    Office Visit    1 year ago Fibroadenoma, right    [facility], Vianney Ross MD    Office Visit    1 year ago Mass of right breast, unspecified quadrant    [facility], Vianney Ross MD    Office Visit    1 year ago Breast lump on right side at 7 o'clock position    [facility], Yeny Ortega APRN    Office Visit

## 2024-11-28 DIAGNOSIS — Z30.41 ENCOUNTER FOR SURVEILLANCE OF CONTRACEPTIVE PILLS: ICD-10-CM

## 2024-11-28 RX ORDER — LEVONORGESTREL AND ETHINYL ESTRADIOL 0.15-0.03
KIT ORAL
Qty: 91 TABLET | Refills: 0 | OUTPATIENT
Start: 2024-11-28

## 2024-11-28 NOTE — TELEPHONE ENCOUNTER
Outpatient Medication Detail     Disp Refills Start End    Levonorgest-Eth Estrad 91-Day (SETLAKIN) 0.15-0.03 MG Oral Tab 84 tablet 3 11/8/2024 --    Sig - Route: Take 1 tablet by mouth daily. - Oral    Sent to pharmacy as: Levonorgest-Eth Estrad 91-Day 0.15-0.03 MG Oral Tablet (Setlakin)    E-Prescribing Status: Receipt confirmed by pharmacy (11/8/2024  1:32 PM CST)      Associated Diagnoses    Encounter for surveillance of contraceptive pills        Pharmacy    [id number] - [place], [place] - [facility] [address]  [phone], [phone]

## 2024-12-18 ENCOUNTER — OFFICE VISIT (OUTPATIENT)
Dept: FAMILY MEDICINE CLINIC | Facility: CLINIC | Age: 29
End: 2024-12-18
Payer: COMMERCIAL

## 2024-12-18 VITALS
TEMPERATURE: 97 F | DIASTOLIC BLOOD PRESSURE: 76 MMHG | BODY MASS INDEX: 34.96 KG/M2 | SYSTOLIC BLOOD PRESSURE: 132 MMHG | WEIGHT: 236 LBS | HEIGHT: 69 IN | HEART RATE: 70 BPM

## 2024-12-18 DIAGNOSIS — H00.014 HORDEOLUM EXTERNUM OF LEFT UPPER EYELID: Primary | ICD-10-CM

## 2024-12-18 PROCEDURE — 3008F BODY MASS INDEX DOCD: CPT | Performed by: NURSE PRACTITIONER

## 2024-12-18 PROCEDURE — 99214 OFFICE O/P EST MOD 30 MIN: CPT | Performed by: NURSE PRACTITIONER

## 2024-12-18 PROCEDURE — 3075F SYST BP GE 130 - 139MM HG: CPT | Performed by: NURSE PRACTITIONER

## 2024-12-18 PROCEDURE — 3078F DIAST BP <80 MM HG: CPT | Performed by: NURSE PRACTITIONER

## 2024-12-18 RX ORDER — ERYTHROMYCIN 5 MG/G
1 OINTMENT OPHTHALMIC EVERY 6 HOURS
Qty: 3.5 G | Refills: 1 | Status: SHIPPED | OUTPATIENT
Start: 2024-12-18

## 2024-12-18 NOTE — PROGRESS NOTES
Swelling      Pt here for left eye fatigue and swelling in left eye for the past 3 days.    Has been putting warm compresses and tea bags.     Does have eyelash extensions    Review of Systems   Constitutional:  Negative for activity change and appetite change.   Eyes:  Positive for pain and redness. Negative for discharge, itching and visual disturbance.       Vitals:    12/18/24 1746   BP: 132/76   Pulse: 70   Temp: 97 °F (36.1 °C)   Weight: 236 lb (107 kg)   Height: 5' 9\" (1.753 m)     Patient's last menstrual period was 09/26/2024 (approximate).  Body mass index is 34.85 kg/m².  Wt Readings from Last 6 Encounters:   12/18/24 236 lb (107 kg)   10/02/24 235 lb 12.8 oz (107 kg)   02/21/24 243 lb 6.4 oz (110.4 kg)   08/07/23 240 lb (108.9 kg)   06/21/23 240 lb (108.9 kg)   02/08/23 240 lb (108.9 kg)      BP Readings from Last 5 Encounters:   12/18/24 132/76   10/02/24 120/79   02/21/24 130/77   08/16/23 128/86   08/07/23 92/66       Health Maintenance   Topic Date Due    DTaP,Tdap,and Td Vaccines (8 - Td or Tdap) 07/28/2022    COVID-19 Vaccine (1 - 2024-25 season) Never done    Influenza Vaccine (1) 10/01/2024    Annual Physical  10/02/2025    Pap Smear  10/02/2027    Annual Depression Screening  Completed    Pneumococcal Vaccine: Birth to 64yrs  Aged Out       Patient's last menstrual period was 09/26/2024 (approximate).    Past Medical History:    Breast fibroadenoma, right    Fibroadenoma, right    Marijuana use    Nexplanon removal    Nexplanon inserted right arm 11/5/19         .  Past Surgical History:   Procedure Laterality Date    Adenoidectomy         History reviewed. No pertinent family history.    Social History     Socioeconomic History    Marital status: Single     Spouse name: Not on file    Number of children: Not on file    Years of education: Not on file    Highest education level: Not on file   Occupational History    Not on file   Tobacco Use    Smoking status: Never    Smokeless tobacco: Never    Vaping Use    Vaping status: Never Used   Substance and Sexual Activity    Alcohol use: Not Currently    Drug use: Yes     Frequency: 5.0 times per week     Types: Cannabis    Sexual activity: Not on file   Other Topics Concern    Caffeine Concern Not Asked    Exercise Not Asked    Seat Belt Not Asked    Special Diet Not Asked    Stress Concern Not Asked    Weight Concern Not Asked   Social History Narrative    Not on file     Social Drivers of Health     Financial Resource Strain: Not on file   Food Insecurity: Not on file   Transportation Needs: Not on file   Physical Activity: Not on file   Stress: Not on file   Social Connections: Not on file   Housing Stability: At Risk (8/18/2023)    [facility]     Living Situation: Not on file     Housing Problems: Not on file       Current Outpatient Medications   Medication Sig Dispense Refill    erythromycin 5 MG/GM Ophthalmic Ointment Place 1 Application into both eyes every 6 (six) hours. 3.5 g 1    Levonorgest-Eth Estrad 91-Day (SETLAKIN) 0.15-0.03 MG Oral Tab Take 1 tablet by mouth daily. 84 tablet 3       Allergies:  Allergies[1]    Physical Exam  Vitals and nursing note reviewed.   Constitutional:       Appearance: Normal appearance. She is obese.   Eyes:      General:         Left eye: Hordeolum present.    Cardiovascular:      Rate and Rhythm: Normal rate.   Pulmonary:      Effort: Pulmonary effort is normal. No respiratory distress.   Musculoskeletal:         General: Swelling present.   Skin:     General: Skin is warm and dry.   Neurological:      Mental Status: She is alert.         Assessment and Plan:  Problem List Items Addressed This Visit       Hordeolum externum of left upper eyelid - Primary     Discussed supportive care  EES eye ointment qid for 5 days   Please call if symptoms worsen or are not resolving.           Relevant Medications    erythromycin 5 MG/GM Ophthalmic Ointment                   Discussed  plan of care with pt and pt is in agreement.All questions answered. Pt to call with questions or concerns.    Encouraged to sign up for My Chart if not already registered.     Note to patient and family:  The 21st Century Cures Act makes medical notes available to patients in the interest of transparency.  However, please be advised that this is a medical document.  It is intended as a peer to peer communication.  It is written in medical language and may contain abbreviations or verbiage that are technical and unfamiliar.  It may appear blunt or direct.  Medical documents are intended to carry relevant information, facts as evident, and the clinical opinion of the practitioner.       [1] No Known Allergies

## 2024-12-19 NOTE — ASSESSMENT & PLAN NOTE
Discussed supportive care  EES eye ointment qid for 5 days   Please call if symptoms worsen or are not resolving.

## 2025-03-29 ENCOUNTER — LAB ENCOUNTER (OUTPATIENT)
Dept: LAB | Age: 30
End: 2025-03-29
Attending: NURSE PRACTITIONER
Payer: COMMERCIAL

## 2025-03-29 DIAGNOSIS — Z11.3 SCREENING EXAMINATION FOR STD (SEXUALLY TRANSMITTED DISEASE): ICD-10-CM

## 2025-03-29 DIAGNOSIS — E66.811 CLASS 1 OBESITY WITHOUT SERIOUS COMORBIDITY WITH BODY MASS INDEX (BMI) OF 33.0 TO 33.9 IN ADULT, UNSPECIFIED OBESITY TYPE: ICD-10-CM

## 2025-03-29 DIAGNOSIS — Z01.419 WELL WOMAN EXAM WITH ROUTINE GYNECOLOGICAL EXAM: ICD-10-CM

## 2025-03-29 LAB
ALBUMIN SERPL-MCNC: 4.4 G/DL (ref 3.2–4.8)
ALBUMIN/GLOB SERPL: 1.4 {RATIO} (ref 1–2)
ALP LIVER SERPL-CCNC: 50 U/L
ALT SERPL-CCNC: 20 U/L
ANION GAP SERPL CALC-SCNC: 11 MMOL/L (ref 0–18)
AST SERPL-CCNC: 34 U/L (ref ?–34)
BILIRUB SERPL-MCNC: 0.4 MG/DL (ref 0.3–1.2)
BUN BLD-MCNC: 7 MG/DL (ref 9–23)
BUN/CREAT SERPL: 10.3 (ref 10–20)
CALCIUM BLD-MCNC: 9 MG/DL (ref 8.7–10.4)
CHLORIDE SERPL-SCNC: 108 MMOL/L (ref 98–112)
CHOLEST SERPL-MCNC: 162 MG/DL (ref ?–200)
CO2 SERPL-SCNC: 22 MMOL/L (ref 21–32)
CREAT BLD-MCNC: 0.68 MG/DL
DEPRECATED RDW RBC AUTO: 40.2 FL (ref 35.1–46.3)
EGFRCR SERPLBLD CKD-EPI 2021: 122 ML/MIN/1.73M2 (ref 60–?)
ERYTHROCYTE [DISTWIDTH] IN BLOOD BY AUTOMATED COUNT: 13.2 % (ref 11–15)
EST. AVERAGE GLUCOSE BLD GHB EST-MCNC: 105 MG/DL (ref 68–126)
FASTING PATIENT LIPID ANSWER: YES
FASTING STATUS PATIENT QL REPORTED: YES
GLOBULIN PLAS-MCNC: 3.1 G/DL (ref 2–3.5)
GLUCOSE BLD-MCNC: 87 MG/DL (ref 70–99)
HBA1C MFR BLD: 5.3 % (ref ?–5.7)
HCT VFR BLD AUTO: 37.9 %
HDLC SERPL-MCNC: 63 MG/DL (ref 40–59)
HGB BLD-MCNC: 12.9 G/DL
INSULIN SERPL-ACNC: 9.8 MU/L (ref 3–25)
LDLC SERPL CALC-MCNC: 88 MG/DL (ref ?–100)
MCH RBC QN AUTO: 28.4 PG (ref 26–34)
MCHC RBC AUTO-ENTMCNC: 34 G/DL (ref 31–37)
MCV RBC AUTO: 83.5 FL
NONHDLC SERPL-MCNC: 99 MG/DL (ref ?–130)
OSMOLALITY SERPL CALC.SUM OF ELEC: 289 MOSM/KG (ref 275–295)
PLATELET # BLD AUTO: 262 10(3)UL (ref 150–450)
POTASSIUM SERPL-SCNC: 3.9 MMOL/L (ref 3.5–5.1)
PROT SERPL-MCNC: 7.5 G/DL (ref 5.7–8.2)
RBC # BLD AUTO: 4.54 X10(6)UL
SODIUM SERPL-SCNC: 141 MMOL/L (ref 136–145)
T PALLIDUM AB SER QL IA: NONREACTIVE
TRIGL SERPL-MCNC: 52 MG/DL (ref 30–149)
TSI SER-ACNC: 0.88 UIU/ML (ref 0.55–4.78)
VIT B12 SERPL-MCNC: 344 PG/ML (ref 211–911)
VIT D+METAB SERPL-MCNC: 7.8 NG/ML (ref 30–100)
VLDLC SERPL CALC-MCNC: 8 MG/DL (ref 0–30)
WBC # BLD AUTO: 7.1 X10(3) UL (ref 4–11)

## 2025-03-29 PROCEDURE — 83525 ASSAY OF INSULIN: CPT

## 2025-03-29 PROCEDURE — 86696 HERPES SIMPLEX TYPE 2 TEST: CPT

## 2025-03-29 PROCEDURE — 84443 ASSAY THYROID STIM HORMONE: CPT

## 2025-03-29 PROCEDURE — 85027 COMPLETE CBC AUTOMATED: CPT

## 2025-03-29 PROCEDURE — 87389 HIV-1 AG W/HIV-1&-2 AB AG IA: CPT

## 2025-03-29 PROCEDURE — 80061 LIPID PANEL: CPT

## 2025-03-29 PROCEDURE — 36415 COLL VENOUS BLD VENIPUNCTURE: CPT

## 2025-03-29 PROCEDURE — 80053 COMPREHEN METABOLIC PANEL: CPT

## 2025-03-29 PROCEDURE — 83036 HEMOGLOBIN GLYCOSYLATED A1C: CPT

## 2025-03-29 PROCEDURE — 82607 VITAMIN B-12: CPT

## 2025-03-29 PROCEDURE — 86780 TREPONEMA PALLIDUM: CPT

## 2025-03-29 PROCEDURE — 86695 HERPES SIMPLEX TYPE 1 TEST: CPT

## 2025-03-29 PROCEDURE — 82306 VITAMIN D 25 HYDROXY: CPT

## 2025-03-31 DIAGNOSIS — E55.9 VITAMIN D DEFICIENCY: Primary | ICD-10-CM

## 2025-03-31 LAB
HSV 1 GLYCOPROTEIN G, IGG: POSITIVE
HSV 2 GLYCOPROTEIN G, IGG: NEGATIVE

## 2025-03-31 RX ORDER — ERGOCALCIFEROL 1.25 MG/1
50000 CAPSULE, LIQUID FILLED ORAL WEEKLY
Qty: 12 CAPSULE | Refills: 0 | Status: SHIPPED | OUTPATIENT
Start: 2025-03-31 | End: 2025-06-17

## 2025-03-31 NOTE — PROGRESS NOTES
1. Vitamin D deficiency  Covering for Yeny  Vitamin D deficient on wellness labs  Correct with ergocalciferol once weekly x 12 weeks  Recheck vitamin D level following  No need for refill  - ergocalciferol 1.25 MG (52963 UT) Oral Cap; Take 1 capsule (50,000 Units total) by mouth once a week for 12 doses.  Dispense: 12 capsule; Refill: 0    SHANNAN Anderson

## 2025-05-19 ENCOUNTER — HOSPITAL ENCOUNTER (OUTPATIENT)
Age: 30
Discharge: HOME | End: 2025-05-19

## 2025-05-19 VITALS
TEMPERATURE: 98 F | DIASTOLIC BLOOD PRESSURE: 92 MMHG | RESPIRATION RATE: 18 BRPM | OXYGEN SATURATION: 99 % | HEART RATE: 72 BPM | SYSTOLIC BLOOD PRESSURE: 142 MMHG
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
## 2025-05-19 PROCEDURE — 99213 OFFICE O/P EST LOW 20 MIN: CPT | Performed by: NURSE PRACTITIONER

## 2025-05-19 RX ORDER — CEFADROXIL 500 MG/1
500 CAPSULE ORAL 2 TIMES DAILY
Qty: 14 CAPSULE | Refills: 0 | Status: SHIPPED | OUTPATIENT
Start: 2025-05-19 | End: 2025-05-26

## 2025-05-19 NOTE — ED PROVIDER NOTES
Patient Seen in: [facility]      History     Chief Complaint   Patient presents with    Bite Sting,Insect     Stated Complaint: -swollen ankle  Subjective:   29-year-old female with no past medical history presents from home.  Patient is here with a bug bite to her left ankle.  States it occurred at work.  States it was a small bug that bit her and then ran away.  Not a spider bite.  No embedded tick.  Initial bite occurred 3 days ago.  States the next day she had increased swelling and redness.  She is having some pain, mostly with walking.  No fever.  No drainage.  She has applied cortisone and calamine and taking Benadryl.    The history is provided by the patient. No  was used.     Objective:   Past Medical History:    Breast fibroadenoma, right    Fibroadenoma, right    Marijuana use    Nexplanon removal    Nexplanon inserted right arm 11/5/19              Past Surgical History:   Procedure Laterality Date    Adenoidectomy                Social History     Socioeconomic History    Marital status: Single   Tobacco Use    Smoking status: Never    Smokeless tobacco: Never   Vaping Use    Vaping status: Never Used   Substance and Sexual Activity    Alcohol use: Not Currently    Drug use: Yes     Frequency: 5.0 times per week     Types: Cannabis     Social Drivers of Health      [facility]            Review of Systems    Positive for stated complaint: Bite Sting,Insect    Other systems are as noted in HPI.  Constitutional and vital signs reviewed.      All other systems reviewed and negative except as noted above.    Physical Exam     ED Triage Vitals [05/19/25 1612]   BP (!) 142/92   Pulse 72   Resp 18   Temp 97.6 °F (36.4 °C)   Temp src Oral   SpO2 99 %   O2 Device None (Room air)     Current:BP (!) 142/92   Pulse 72   Temp 97.6 °F (36.4 °C) (Oral)   Resp 18   LMP 02/23/2025 (Approximate)   SpO2 99%     Physical Exam  Vitals and nursing note  reviewed.   Constitutional:       General: She is not in acute distress.     Appearance: Normal appearance. She is not ill-appearing or toxic-appearing.   HENT:      Head: Normocephalic and atraumatic.      Nose: Nose normal.      Mouth/Throat:      Mouth: Mucous membranes are moist.      Pharynx: Oropharynx is clear.   Eyes:      Pupils: Pupils are equal, round, and reactive to light.   Cardiovascular:      Rate and Rhythm: Normal rate and regular rhythm.      Pulses: Normal pulses.   Pulmonary:      Effort: Pulmonary effort is normal. No respiratory distress.      Breath sounds: Normal breath sounds.      Comments: Lungs clear.  No adventitious lung sounds.  No distress.  No hypoxia.  Pulse ox 99% ra. Which is normal    Abdominal:      General: Abdomen is flat.      Palpations: Abdomen is soft.   Musculoskeletal:         General: No signs of injury. Normal range of motion.      Cervical back: Normal range of motion and neck supple.      Left ankle: Swelling (erythema, bug bite) present. Normal range of motion.   Skin:     General: Skin is warm and dry.      Capillary Refill: Capillary refill takes less than 2 seconds.      Findings: Erythema present.      Comments: 5 cm area of irregularly shaped erythema, warmth to the left medial ankle.  There is a central area that is darker in color that appears consistent with bug bite.  No fluctuance.  There is some induration.  Swelling.  The area is tender.  No spontaneous drainage.  The swelling and erythema do not extend to the foot.  CMS intact.   Neurological:      General: No focal deficit present.      Mental Status: She is alert and oriented to person, place, and time.      GCS: GCS eye subscore is 4. GCS verbal subscore is 5. GCS motor subscore is 6.   Psychiatric:         Mood and Affect: Mood normal.         Behavior: Behavior normal.         Thought Content: Thought content normal.         Judgment: Judgment normal.         ED Course   Radiology:  No results  found.  Labs Reviewed - No data to display    MDM     Medical Decision Making  Differential diagnoses reflecting the complexity of care include: Left ankle bug bite, cellulitis, abscess  Patient with known bug bite to the left ankle now with a cellulitis.  Erythema, warmth, induration, tenderness.  No fever or chills.  Nontoxic-appearing.  No evidence of sepsis.  No evidence of septic joint.  No fluctuance or abscess to drain  No embedded tick.  No other rash or symptoms of Lyme disease.  Patient well-appearing on exam.  No fever.  Presentation consistent with bug bite with associated cellulitis.  She is not diabetic.    Plan of Care: Rx Duricef.  Recommend Zyrtec and cool compresses.  Tylenol for pain.  Needs wound check with primary physician this week.  Patient was encouraged to go to the emergency room if worsening symptoms, increased pain, fever, chills, body    Results and plan of care discussed with the patient/family. They are in agreement with discharge. They understand to follow up with their primary doctor or the referral physician for further evaluation, especially if no improvement.  Also discussed the limitations of immediate care, patient is aware that if symptoms are worse they should go to the emergency room. Verbal and written discharge instructions were given.     My independent interpretation of studies of: N/A  Diagnostic tests and medications considered but not ordered were: No indication for imaging or labs at this time  Shared decision making was done by: Patient agreeable to outpatient management with oral antibiotics and close follow-up with primary physician  Comorbidities that add complexity to management include: None  External chart review was done and was noted: Reviewed, routine primary care  History obtained by an independent source was from: N/A  Discussions and management was done with: N/A  Social determinants of health that affect care: N/A              Problems Addressed:  Bug  bite with infection, initial encounter: acute illness or injury    Risk  OTC drugs.  Prescription drug management.        Disposition and Plan     Clinical Impression:  1. Bug bite with infection, initial encounter         Disposition:  Discharge  5/19/2025  4:20 pm    Follow-up:  Yeny Perez, APRN  [address] [address]  [phone]                Medications Prescribed:  Discharge Medication List as of 5/19/2025  4:26 PM        START taking these medications    Details   cefadroxil 500 MG Oral Cap Take 1 capsule (500 mg total) by mouth 2 (two) times daily for 7 days., Normal, Disp-14 capsule, R-0

## 2025-05-19 NOTE — DISCHARGE INSTRUCTIONS
Take Duricef twice a day.  Take Zyrtec twice a day (you may continue taking Benadryl if you would like but Zyrtec will not make you sleepy).  Apply cool compresses.  Take Tylenol as needed for pain.  Recheck with your primary physician or go to the emergency room if worsening symptoms

## 2025-07-14 ENCOUNTER — PATIENT MESSAGE (OUTPATIENT)
Dept: FAMILY MEDICINE CLINIC | Facility: CLINIC | Age: 30
End: 2025-07-14

## 2025-07-14 ENCOUNTER — HOSPITAL ENCOUNTER (OUTPATIENT)
Age: 30
Discharge: HOME | End: 2025-07-14
Payer: COMMERCIAL

## 2025-07-14 VITALS
TEMPERATURE: 98 F | HEART RATE: 87 BPM | DIASTOLIC BLOOD PRESSURE: 75 MMHG | RESPIRATION RATE: 18 BRPM | OXYGEN SATURATION: 100 % | SYSTOLIC BLOOD PRESSURE: 127 MMHG

## 2025-07-14 DIAGNOSIS — J06.9 UPPER RESPIRATORY VIRUS: Primary | ICD-10-CM

## 2025-07-14 LAB
S PYO AG THROAT QL: NEGATIVE
SARS-COV-2 RNA RESP QL NAA+PROBE: NOT DETECTED

## 2025-07-14 PROCEDURE — U0002 COVID-19 LAB TEST NON-CDC: HCPCS | Performed by: NURSE PRACTITIONER

## 2025-07-14 PROCEDURE — 87880 STREP A ASSAY W/OPTIC: CPT | Performed by: NURSE PRACTITIONER

## 2025-07-14 PROCEDURE — 99213 OFFICE O/P EST LOW 20 MIN: CPT | Performed by: NURSE PRACTITIONER

## 2025-07-14 NOTE — ED PROVIDER NOTES
He    Patient Seen in: [facility]      History     Chief Complaint   Patient presents with    Sore Throat    Nasal Congestion    Fever     Stated Complaint: Sore throat; Congestion; Headache  Subjective:   29-year-old healthy female presents for URI symptoms that started 2 days ago.  She has nasal congestion, body aches, a generalized headache, sore throat, and mild cough.  She states she has had tactile fevers and chills.  No sick contacts at home.  No chest pain or difficulty breathing.  She is eating and drinking without vomiting or diarrhea.  No neck stiffness.  No rashes.  No dizziness.  She appears nontoxic.      Objective:   Past Medical History:    Breast fibroadenoma, right    Fibroadenoma, right    Marijuana use    Nexplanon removal    Nexplanon inserted right arm 11/5/19              Past Surgical History:   Procedure Laterality Date    Adenoidectomy                Social History     Socioeconomic History    Marital status: Single   Tobacco Use    Smoking status: Never    Smokeless tobacco: Never   Vaping Use    Vaping status: Never Used   Substance and Sexual Activity    Alcohol use: Not Currently    Drug use: Yes     Frequency: 5.0 times per week     Types: Cannabis     Social Drivers of Health      [facility]            Review of Systems    Positive for stated complaint: Sore Throat, Nasal Congestion, and Fever     Other systems are as noted in HPI.  Constitutional and vital signs reviewed.      All other systems reviewed and negative except as noted above.    Physical Exam     ED Triage Vitals [07/14/25 1210]   /75   Pulse 87   Resp 18   Temp 98.4 °F (36.9 °C)   Temp src Oral   SpO2 100 %   O2 Device None (Room air)     Current:/75   Pulse 87   Temp 98.4 °F (36.9 °C) (Oral)   Resp 18   LMP 02/23/2025 (Approximate)   SpO2 100%     Physical Exam  Vitals and nursing note reviewed.   Constitutional:       General: She is not in acute distress.      Appearance: Normal appearance. She is not toxic-appearing.   HENT:      Head: Normocephalic.      Right Ear: Ear canal normal. A middle ear effusion is present. Tympanic membrane is not erythematous.      Left Ear: Ear canal normal. A middle ear effusion is present. Tympanic membrane is not erythematous.      Nose: Congestion present.      Mouth/Throat:      Lips: Pink.      Mouth: Mucous membranes are moist.      Pharynx: Oropharynx is clear. Uvula midline. Posterior oropharyngeal erythema present. No pharyngeal swelling, oropharyngeal exudate, uvula swelling or postnasal drip.      Tonsils: No tonsillar exudate.   Eyes:      Extraocular Movements: Extraocular movements intact.      Conjunctiva/sclera: Conjunctivae normal.      Pupils: Pupils are equal, round, and reactive to light.   Cardiovascular:      Rate and Rhythm: Normal rate and regular rhythm.   Pulmonary:      Effort: Pulmonary effort is normal.      Breath sounds: Normal breath sounds. No wheezing, rhonchi or rales.   Musculoskeletal:         General: Normal range of motion.      Cervical back: Normal range of motion and neck supple.   Lymphadenopathy:      Cervical: No cervical adenopathy.   Skin:     General: Skin is warm and dry.      Capillary Refill: Capillary refill takes less than 2 seconds.      Findings: No rash.   Neurological:      General: No focal deficit present.      Mental Status: She is alert and oriented to person, place, and time.   Psychiatric:         Mood and Affect: Mood normal.         Behavior: Behavior normal.         ED Course   No results found.  Labs Reviewed   POCT RAPID STREP - Normal   RAPID SARS-COV-2 BY PCR - Normal       MDM     Medical Decision Making  The patient is aware of her testing results below.  Her symptoms are most likely viral.  We discussed pushing fluids, rest, Tylenol or Motrin as needed for pain or fever.  She will follow-up as needed with her primary care provider or the provider referred to  her.    Amount and/or Complexity of Data Reviewed  Labs: ordered.     Details: Strep negative  COVID-negative    Risk  OTC drugs.  Risk Details: URI versus strep throat versus COVID        Disposition and Plan     Clinical Impression:  1. Upper respiratory virus         Disposition:  Discharge  7/14/2025 12:57 pm    Follow-up:  Herberth Nair, SHANNAN  [address] [place] [id number]  [phone]    Schedule an appointment as soon as possible for a visit   As needed          Medications Prescribed:  Discharge Medication List as of 7/14/2025  1:01 PM

## 2025-07-14 NOTE — DISCHARGE INSTRUCTIONS
Continue supportive care.  Push fluids.  Rest.  Tylenol or ibuprofen as needed for pain or fever.  Follow-up as needed with your primary care provider or the provider referred to.  Return for any concerns.

## 2025-07-16 ENCOUNTER — OFFICE VISIT (OUTPATIENT)
Dept: FAMILY MEDICINE CLINIC | Facility: CLINIC | Age: 30
End: 2025-07-16
Payer: COMMERCIAL

## 2025-07-16 VITALS
BODY MASS INDEX: 32.12 KG/M2 | SYSTOLIC BLOOD PRESSURE: 116 MMHG | HEART RATE: 106 BPM | DIASTOLIC BLOOD PRESSURE: 76 MMHG | WEIGHT: 224.38 LBS | TEMPERATURE: 99 F | HEIGHT: 70 IN

## 2025-07-16 DIAGNOSIS — H10.33 ACUTE BACTERIAL CONJUNCTIVITIS OF BOTH EYES: ICD-10-CM

## 2025-07-16 DIAGNOSIS — H65.01 NON-RECURRENT ACUTE SEROUS OTITIS MEDIA OF RIGHT EAR: ICD-10-CM

## 2025-07-16 DIAGNOSIS — J01.00 ACUTE NON-RECURRENT MAXILLARY SINUSITIS: Primary | ICD-10-CM

## 2025-07-16 PROCEDURE — 99214 OFFICE O/P EST MOD 30 MIN: CPT | Performed by: NURSE PRACTITIONER

## 2025-07-16 NOTE — ASSESSMENT & PLAN NOTE
Augmentin 875 mg I po twice a day x 10 days  Antibiotic Information  -complete course of antibiotics as prescribed-not completing course of antibiotics may result in infection not fully eradicated or may lead to antibiotic resistance  -eat probiotic yogurt (Activia)  or take probiotic capsules i.e Align or Florastor (sprinkles are available for children)  -take antibiotics on full stomach unless noted otherwise   -Call or return to office if symptoms are not markedly improved within 3-4 days or if symptoms are worsening  -if on oral, patch or ring contraceptives, use back up birth control for 2 weeks.

## 2025-07-16 NOTE — PROGRESS NOTES
[facility]  Associated symptoms include congestion, coughing, fatigue, a fever, headaches, myalgias and a sore throat. Pertinent negatives include no abdominal pain or chest pain.     Pt presents for URI symptoms-sat has sore throat-Sunday and Monday symptoms worsened, had body aches and felt very hot.  Does not have thermometer so could not check temp. Cough, congestion and bilateral red eyes started on Monday into Tuesday.  Has greenish yellow discharge from eyes.    Went to  on Monday. Covid and strep negative .    Review of Systems   Constitutional:  Positive for activity change, fatigue and fever. Negative for appetite change.   HENT:  Positive for congestion, ear pain, rhinorrhea, sinus pressure, sinus pain and sore throat.    Eyes:  Positive for discharge and redness.   Respiratory:  Positive for cough. Negative for chest tightness, shortness of breath and wheezing.    Cardiovascular:  Negative for chest pain.   Gastrointestinal:  Negative for abdominal pain.   Musculoskeletal:  Positive for myalgias.   Neurological:  Positive for headaches. Negative for dizziness.       Vitals:    07/16/25 0959   BP: 116/76   Pulse: 106   Temp: 99.2 °F (37.3 °C)   Weight: 224 lb 6.4 oz (101.8 kg)   Height: 5' 10\" (1.778 m)     Patient's last menstrual period was 02/23/2025 (approximate).  Body mass index is 32.2 kg/m².  Wt Readings from Last 6 Encounters:   07/16/25 224 lb 6.4 oz (101.8 kg)   12/18/24 236 lb (107 kg)   10/02/24 235 lb 12.8 oz (107 kg)   02/21/24 243 lb 6.4 oz (110.4 kg)   08/07/23 240 lb (108.9 kg)   06/21/23 240 lb (108.9 kg)      BP Readings from Last 5 Encounters:   07/16/25 116/76   07/14/25 127/75   05/19/25 (!) 142/92   12/18/24 132/76   10/02/24 120/79       Health Maintenance   Topic Date Due    DTaP,Tdap,and Td Vaccines (8 - Td or Tdap) 07/28/2022    COVID-19 Vaccine (1 - 2024-25 season) Never done    Annual Depression Screening  01/01/2025    Annual Physical  10/02/2025    Influenza Vaccine (1)  10/01/2025    Pap Smear  10/02/2027    Pneumococcal Vaccine: Birth to 50yrs  Aged Out    Meningococcal B Vaccine  Aged Out       Patient's last menstrual period was 02/23/2025 (approximate).    Past Medical History[1]    .Past Surgical History[2]    Family History[3]    Social History     Socioeconomic History    Marital status: Single     Spouse name: Not on file    Number of children: Not on file    Years of education: Not on file    Highest education level: Not on file   Occupational History    Not on file   Tobacco Use    Smoking status: Never    Smokeless tobacco: Never   Vaping Use    Vaping status: Never Used   Substance and Sexual Activity    Alcohol use: Not Currently    Drug use: Yes     Frequency: 5.0 times per week     Types: Cannabis    Sexual activity: Not on file   Other Topics Concern    Caffeine Concern Not Asked    Exercise Not Asked    Seat Belt Not Asked    Special Diet Not Asked    Stress Concern Not Asked    Weight Concern Not Asked   Social History Narrative    Not on file     Social Drivers of Health     Food Insecurity: Not on file   Transportation Needs: Not on file   Stress: Not on file   Housing Stability: At Risk (8/18/2023)    Received from [facility]     Living Situation: Not on file     Housing Problems: Not on file       Current Medications[4]    Allergies:  Allergies[5]    Physical Exam  Vitals and nursing note reviewed.   Constitutional:       Appearance: She is ill-appearing.   HENT:      Head: Normocephalic.      Right Ear: Tympanic membrane is erythematous.      Left Ear: Tympanic membrane, ear canal and external ear normal.      Nose: Congestion and rhinorrhea present.      Comments: Decreased max transillumination bilat     Mouth/Throat:      Mouth: Mucous membranes are moist.      Pharynx: Posterior oropharyngeal erythema present.      Comments: Tonsils 1+, erythematous, petichiae noted to palate  Eyes:      General:         Right eye: Discharge present.          Left eye: Discharge present.     Conjunctiva/sclera:      Right eye: Right conjunctiva is injected.      Left eye: Left conjunctiva is injected.   Cardiovascular:      Rate and Rhythm: Normal rate and regular rhythm.      Heart sounds: Normal heart sounds.   Pulmonary:      Effort: Pulmonary effort is normal. No respiratory distress.      Breath sounds: Normal breath sounds.   Lymphadenopathy:      Cervical: Cervical adenopathy present.   Skin:     General: Skin is warm.      Comments: Pt is slightly diaphoretic   Neurological:      Mental Status: She is alert and oriented to person, place, and time.         Assessment and Plan:  Problem List Items Addressed This Visit       Acute bacterial conjunctivitis of both eyes    Azithromycin eye drops as directed  Warm compresses  Please call if symptoms worsen or are not resolving.           Relevant Medications    Azithromycin 1 % Ophthalmic Solution    Acute non-recurrent maxillary sinusitis - Primary    Augmentin 875 mg I po twice a day x 10 days  Antibiotic Information  -complete course of antibiotics as prescribed-not completing course of antibiotics may result in infection not fully eradicated or may lead to antibiotic resistance  -eat probiotic yogurt (Activia)  or take probiotic capsules i.e Align or Florastor (sprinkles are available for children)  -take antibiotics on full stomach unless noted otherwise   -Call or return to office if symptoms are not markedly improved within 3-4 days or if symptoms are worsening  -if on oral, patch or ring contraceptives, use back up birth control for 2 weeks.           Relevant Medications    amoxicillin clavulanate 875-125 MG Oral Tab    Non-recurrent acute serous otitis media of right ear    Augmentin 875 mg I po twice a day x 10 days  Supportive care discussed to help alleviate symptoms  Please call if symptoms worsen or are not resolving.           Relevant Medications    amoxicillin clavulanate 875-125 MG Oral Tab                    Discussed plan of care with pt and pt is in agreement.All questions answered. Pt to call with questions or concerns.    Encouraged to sign up for My Chart if not already registered.     Note to patient and family:  The 21st Century Cures Act makes medical notes available to patients in the interest of transparency.  However, please be advised that this is a medical document.  It is intended as a peer to peer communication.  It is written in medical language and may contain abbreviations or verbiage that are technical and unfamiliar.  It may appear blunt or direct.  Medical documents are intended to carry relevant information, facts as evident, and the clinical opinion of the practitioner.       [1]   Past Medical History:   Breast fibroadenoma, right    Fibroadenoma, right    Marijuana use    Nexplanon removal    Nexplanon inserted right arm 11/5/19     [2]   Past Surgical History:  Procedure Laterality Date    Adenoidectomy     [3] History reviewed. No pertinent family history.  [4]   Current Outpatient Medications   Medication Sig Dispense Refill    amoxicillin clavulanate 875-125 MG Oral Tab Take 1 tablet by mouth 2 (two) times daily for 10 days. 20 tablet 0    Azithromycin 1 % Ophthalmic Solution Place 2 drops into both eyes 2 (two) times daily for 2 days, THEN 2 drops daily for 5 days. 2.5 mL 0    Levonorgest-Eth Estrad 91-Day (SETLAKIN) 0.15-0.03 MG Oral Tab Take 1 tablet by mouth daily. 84 tablet 3    erythromycin 5 MG/GM Ophthalmic Ointment Place 1 Application into both eyes every 6 (six) hours. (Patient not taking: Reported on 7/16/2025) 3.5 g 1   [5] No Known Allergies

## 2025-07-16 NOTE — ASSESSMENT & PLAN NOTE
Augmentin 875 mg I po twice a day x 10 days  Supportive care discussed to help alleviate symptoms  Please call if symptoms worsen or are not resolving.

## 2025-07-16 NOTE — PATIENT INSTRUCTIONS
Antibiotic Information  -complete course of antibiotics as prescribed-not completing course of antibiotics may result in infection not fully eradicated or may lead to antibiotic resistance  -eat probiotic yogurt (Activia)  or take probiotic capsules i.e Align or Florastor (sprinkles are available for children)  -take antibiotics on full stomach unless noted otherwise   -Call or return to office if symptoms are not markedly improved within 3-4 days or if symptoms are worsening  -if on oral, patch or ring contraceptives, use back up birth control for 2 weeks.

## 2025-07-16 NOTE — ASSESSMENT & PLAN NOTE
Azithromycin eye drops as directed  Warm compresses  Please call if symptoms worsen or are not resolving.

## 2025-07-18 ENCOUNTER — PATIENT MESSAGE (OUTPATIENT)
Dept: FAMILY MEDICINE CLINIC | Facility: CLINIC | Age: 30
End: 2025-07-18

## 2025-07-19 RX ORDER — NEOMYCIN/POLYMYXIN B/HYDROCORT 3.5-10K-1
1 SUSPENSION, DROPS(FINAL DOSAGE FORM)(ML) OPHTHALMIC (EYE) 4 TIMES DAILY
Qty: 7.5 ML | Refills: 0 | Status: SHIPPED | OUTPATIENT
Start: 2025-07-19

## 2025-07-19 NOTE — TELEPHONE ENCOUNTER
Please advise on substitute as it is on backorder Routed to PCT and oncall provider.     amoxicillin clavulanate 875-125 MG Oral Tab20 [id number] - Route: Take 1 tablet by mouth 2 (two) times daily for 10 days. - OralSent to pharmacy as: Amoxicillin-Pot Clavulanate 875-125 MG Oral Tablet (Augmentin)E-Prescribing Status: Receipt confirmed by pharmacy (7/16/2025 10:23 AM CDT)

## (undated) DEVICE — DRAPE TAPE: Brand: CONVERTORS

## (undated) DEVICE — FLEXIBLE YANKAUER,MEDIUM TIP, NO VACUUM CONTROL: Brand: ARGYLE

## (undated) DEVICE — PAD,ABDOMINAL,8"X7.5",STERILE,LF,1/PK: Brand: MEDLINE

## (undated) DEVICE — DRAPE PACK CHEST & U BAR

## (undated) DEVICE — MINOR GENERAL: Brand: MEDLINE INDUSTRIES, INC.

## (undated) DEVICE — MEGADYNE E-Z CLEAN BLADE 2.75"

## (undated) DEVICE — HOVERMATT 34IN SINGLE USE

## (undated) DEVICE — 12 ML SYRINGE LUER-LOCK TIP: Brand: MONOJECT

## (undated) DEVICE — BRA SURG ELIZ PINK XL

## (undated) DEVICE — TOWEL SURG OR 17X30IN BLUE

## (undated) DEVICE — BRA SURG ELIZ PINK L

## (undated) DEVICE — SUT MONOCRYL 4-0 PS-2 Y496G

## (undated) DEVICE — SOL NACL IRRIG 0.9% 1000ML BTL

## (undated) DEVICE — SUT SILK 2-0 FS 685G

## (undated) DEVICE — CONTAINER,SPECIMEN,OR STERILE,4OZ: Brand: MEDLINE

## (undated) DEVICE — SUT VICRYL 3-0 SH J416H

## (undated) DEVICE — GAMMEX® PI HYBRID SIZE 6.5, STERILE POWDER-FREE SURGICAL GLOVE, POLYISOPRENE AND NEOPRENE BLEND: Brand: GAMMEX

## (undated) NOTE — LETTER
AUTHORIZATION FOR SURGICAL OPERATION OR OTHER PROCEDURE    1.  I hereby authorize FANY Coker, and [facility] staff assigned to my case to perform the following operation and/or procedure at the [facility]:    ________Nexplanon     __________ (please print)      Patient signature:  ___________________________________________________             Relationship to Patient:           []  Parent    Responsible person                          []  Spouse  In case of minor or                    [] Other

## (undated) NOTE — LETTER
Date & Time: 2/24/2019, 8:13 PM  Patient: Anel Hernandez  Encounter Provider(s):    Alberto Ureña MD       To Whom It May Concern:    Anel Hernandez was seen and treated in our department on 2/24/2019. She can return to work with these limitations:  Mu

## (undated) NOTE — LETTER
[facility] [address] [address]  Dept: [phone]  Dept Fax: [phone]  Loc: [phone]         May 19, 2025    Patient: Griselda Mena   YOB: 1995   Date of Visit: 5/19/2025       To Whom It May Concern:    Griselda Mena was seen and treated in our [facility] department on 5/19/2025. She may return to work on 5/21/25.    If you have any questions or concerns, please don't hesitate to call.      Encounter Provider(s):    Laly Kilgore, APRN     4:17 PM  5/19/2025

## (undated) NOTE — ED AVS SNAPSHOT
Carmen Shepherd   MRN: [id number]    Department:  [facility] Emergency Department   Date of Visit:  2/24/2019           Disclosure     Insurance plans vary and the physician(s) referred by the ER may not be covered by your plan.  Please contact yo CARE PHYSICIAN AT ONCE OR RETURN IMMEDIATELY TO THE EMERGENCY DEPARTMENT. If you have been prescribed any medication(s), please fill your prescription right away and begin taking the medication(s) as directed.   If you believe that any of the medications

## (undated) NOTE — LETTER
AUTHORIZATION FOR SURGICAL OPERATION OR OTHER PROCEDURE    1.  I hereby authorize SHANNAN Conway, and [facility] staff assigned to my case to perform the following operation and/or procedure at the [facility]:    ______________Nexplanon removal LEMUEL  P.M.        Patient Name:  ______________________________________________________  (please print)      Patient signature:  ___________________________________________________             Relationship to Patient:           []  Parent    Responsible per

## (undated) NOTE — LETTER
[address] [place] [address]  Authorization for Surgical Operation or Procedure  Date: ______________       Time: _______________  1.  I hereby authorize Dr. Marleny Mendieta, my physician and the assistant, to perform the following operation and/o 5. I consent to the photographing of the operations or procedures to be performed for the purposes of advancing medicine, science, and/or education, provided my identity is not revealed.  If the procedure has been videotaped, the physician/surgeon will obta risks and benefits involved in the proposed treatment and any reasonable alternative to the proposed treatment. I have also explained the risks and benefits involved in the refusal of the proposed treatment and have answered the patient's questions.  If I h

## (undated) NOTE — LETTER
7/16/2025          To Whom It May Concern:    Griselda Mena is currently under my medical care and may not return to work at this time.    Please excuse Griselda for 5 days.  She may return to work on 7/21/25.  Activity is restricted as follows: none.    If you require additional information please contact our office.        Sincerely,      SHANNAN Interiano          Document generated by:  SHANNAN Interiano